# Patient Record
Sex: FEMALE | Race: WHITE | NOT HISPANIC OR LATINO | Employment: OTHER | URBAN - METROPOLITAN AREA
[De-identification: names, ages, dates, MRNs, and addresses within clinical notes are randomized per-mention and may not be internally consistent; named-entity substitution may affect disease eponyms.]

---

## 2017-01-11 PROBLEM — N28.9 RENAL INSUFFICIENCY: Status: ACTIVE | Noted: 2017-01-11

## 2017-03-16 ENCOUNTER — HOSPITAL ENCOUNTER (OUTPATIENT)
Dept: LAB | Facility: MEDICAL CENTER | Age: 82
End: 2017-03-16
Attending: INTERNAL MEDICINE
Payer: MEDICARE

## 2017-03-16 DIAGNOSIS — I10 ESSENTIAL HYPERTENSION, BENIGN: ICD-10-CM

## 2017-03-16 DIAGNOSIS — E78.5 HYPERLIPIDEMIA, UNSPECIFIED HYPERLIPIDEMIA TYPE: ICD-10-CM

## 2017-03-16 LAB
ALBUMIN SERPL BCP-MCNC: 4 G/DL (ref 3.2–4.9)
ALBUMIN/GLOB SERPL: 1.5 G/DL
ALP SERPL-CCNC: 59 U/L (ref 30–99)
ALT SERPL-CCNC: 13 U/L (ref 2–50)
ANION GAP SERPL CALC-SCNC: 7 MMOL/L (ref 0–11.9)
AST SERPL-CCNC: 20 U/L (ref 12–45)
BASOPHILS # BLD AUTO: 1.3 % (ref 0–1.8)
BASOPHILS # BLD: 0.08 K/UL (ref 0–0.12)
BILIRUB SERPL-MCNC: 0.7 MG/DL (ref 0.1–1.5)
BUN SERPL-MCNC: 22 MG/DL (ref 8–22)
CALCIUM SERPL-MCNC: 9.1 MG/DL (ref 8.4–10.2)
CHLORIDE SERPL-SCNC: 109 MMOL/L (ref 96–112)
CHOLEST SERPL-MCNC: 210 MG/DL (ref 100–199)
CO2 SERPL-SCNC: 22 MMOL/L (ref 20–33)
CREAT SERPL-MCNC: 1.12 MG/DL (ref 0.5–1.4)
EOSINOPHIL # BLD AUTO: 0.26 K/UL (ref 0–0.51)
EOSINOPHIL NFR BLD: 4.3 % (ref 0–6.9)
ERYTHROCYTE [DISTWIDTH] IN BLOOD BY AUTOMATED COUNT: 43 FL (ref 35.9–50)
GFR SERPL CREATININE-BSD FRML MDRD: 46 ML/MIN/1.73 M 2
GLOBULIN SER CALC-MCNC: 2.7 G/DL (ref 1.9–3.5)
GLUCOSE SERPL-MCNC: 96 MG/DL (ref 65–99)
HCT VFR BLD AUTO: 34.2 % (ref 37–47)
HDLC SERPL-MCNC: 49 MG/DL
HGB BLD-MCNC: 11.7 G/DL (ref 12–16)
IMM GRANULOCYTES # BLD AUTO: 0.03 K/UL (ref 0–0.11)
IMM GRANULOCYTES NFR BLD AUTO: 0.5 % (ref 0–0.9)
LDLC SERPL CALC-MCNC: 116 MG/DL
LYMPHOCYTES # BLD AUTO: 1.92 K/UL (ref 1–4.8)
LYMPHOCYTES NFR BLD: 31.8 % (ref 22–41)
MCH RBC QN AUTO: 32.6 PG (ref 27–33)
MCHC RBC AUTO-ENTMCNC: 34.2 G/DL (ref 33.6–35)
MCV RBC AUTO: 95.3 FL (ref 81.4–97.8)
MONOCYTES # BLD AUTO: 0.68 K/UL (ref 0–0.85)
MONOCYTES NFR BLD AUTO: 11.3 % (ref 0–13.4)
NEUTROPHILS # BLD AUTO: 3.06 K/UL (ref 2–7.15)
NEUTROPHILS NFR BLD: 50.8 % (ref 44–72)
NRBC # BLD AUTO: 0 K/UL
NRBC BLD AUTO-RTO: 0 /100 WBC
PLATELET # BLD AUTO: 201 K/UL (ref 164–446)
PMV BLD AUTO: 9.4 FL (ref 9–12.9)
POTASSIUM SERPL-SCNC: 4.1 MMOL/L (ref 3.6–5.5)
PROT SERPL-MCNC: 6.7 G/DL (ref 6–8.2)
RBC # BLD AUTO: 3.59 M/UL (ref 4.2–5.4)
SODIUM SERPL-SCNC: 138 MMOL/L (ref 135–145)
T4 FREE SERPL-MCNC: 1.36 NG/DL (ref 0.58–1.64)
TRIGL SERPL-MCNC: 224 MG/DL (ref 0–149)
TSH SERPL DL<=0.005 MIU/L-ACNC: 0.16 UIU/ML (ref 0.35–5.5)
WBC # BLD AUTO: 6 K/UL (ref 4.8–10.8)

## 2017-03-16 PROCEDURE — 80061 LIPID PANEL: CPT

## 2017-03-16 PROCEDURE — 80053 COMPREHEN METABOLIC PANEL: CPT

## 2017-03-16 PROCEDURE — 36415 COLL VENOUS BLD VENIPUNCTURE: CPT

## 2017-03-16 PROCEDURE — 85025 COMPLETE CBC W/AUTO DIFF WBC: CPT

## 2017-03-16 PROCEDURE — 84443 ASSAY THYROID STIM HORMONE: CPT

## 2017-03-16 PROCEDURE — 84439 ASSAY OF FREE THYROXINE: CPT

## 2018-06-04 ENCOUNTER — OFFICE VISIT (OUTPATIENT)
Dept: MEDICAL GROUP | Facility: MEDICAL CENTER | Age: 83
End: 2018-06-04
Payer: COMMERCIAL

## 2018-06-04 VITALS
HEIGHT: 63 IN | OXYGEN SATURATION: 96 % | DIASTOLIC BLOOD PRESSURE: 70 MMHG | BODY MASS INDEX: 28.44 KG/M2 | SYSTOLIC BLOOD PRESSURE: 126 MMHG | TEMPERATURE: 98.2 F | HEART RATE: 89 BPM | RESPIRATION RATE: 14 BRPM | WEIGHT: 160.5 LBS

## 2018-06-04 DIAGNOSIS — D64.9 ANEMIA, UNSPECIFIED TYPE: ICD-10-CM

## 2018-06-04 DIAGNOSIS — E03.9 ACQUIRED HYPOTHYROIDISM: ICD-10-CM

## 2018-06-04 DIAGNOSIS — I10 ESSENTIAL HYPERTENSION, BENIGN: ICD-10-CM

## 2018-06-04 DIAGNOSIS — R73.03 PREDIABETES: ICD-10-CM

## 2018-06-04 DIAGNOSIS — R11.15 NON-INTRACTABLE CYCLICAL VOMITING WITH NAUSEA: ICD-10-CM

## 2018-06-04 PROCEDURE — 99204 OFFICE O/P NEW MOD 45 MIN: CPT | Performed by: FAMILY MEDICINE

## 2018-06-04 RX ORDER — LEVOTHYROXINE SODIUM 100 MCG
TABLET ORAL
Qty: 90 TAB | Refills: 3 | Status: SHIPPED | OUTPATIENT
Start: 2018-06-04 | End: 2018-06-10

## 2018-06-04 RX ORDER — IRBESARTAN 150 MG/1
TABLET ORAL
Qty: 90 TAB | Refills: 3 | Status: SHIPPED | OUTPATIENT
Start: 2018-06-04 | End: 2018-06-10

## 2018-06-04 NOTE — ASSESSMENT & PLAN NOTE
This is a new problem that started approximately 30 days ago for this patient.  When it initially happened, her son with whom she resides, states that it happened after she ate a fairly diverse dinner and they thought it was food poisoning because she had both emesis and diarrhea.  Then 10 days later it occurred after a lunch where they shared a sandwich and he did not become ill and all she had was emesis.  In the most recent episode 7 days ago with emesis after breakfast.  When this occurs she simply throws up and then feels better but ends up being very tired and sleeping for 8-9 hours.  They found nothing that makes this better other than throwing up.  Also found nothing that makes it worse.  She has not had blood work done for approximately 4 months.  She has a history of hysterectomy but retains her ovaries, but denies pain of any type.

## 2018-06-04 NOTE — ASSESSMENT & PLAN NOTE
This is a chronic health problem that is uncontrolled with current medications and lifestyle measures.  Needs blood work .

## 2018-06-04 NOTE — PROGRESS NOTES
CC:  Diagnoses of Essential hypertension, benign, Prediabetes, Acquired hypothyroidism, Anemia, unspecified type, and Non-intractable cyclical vomiting with nausea were pertinent to this visit.    HISTORY OF THE PRESENT ILLNESS: Patient is a 89 y.o. female. This pleasant patient is here today accompanied by her son, Arnold with whom she resides.  They are here to establish care.  Previously she lived in [place] and saw Dr. Bladimir Figueroa.  She has not establish care within [facility].    Health Maintenance: Completed      Essential hypertension, benign  This is a chronic health problem that is well controlled with current medications and lifestyle measures.  The patient denies chest pain, shortness of breath or dyspnea on exertion.      Prediabetes  This is a chronic health problem that is uncontrolled with current medications and lifestyle measures.  Needs baseline blood work.    Hypothyroidism  This is a chronic health problem that is uncontrolled with current medications and lifestyle measures.  Needs blood work .    Anemia  This is a chronic health problem that is uncontrolled with current medications and lifestyle measures.    Non-intractable cyclical vomiting with nausea  This is a new problem that started approximately 30 days ago for this patient.  When it initially happened, her son with whom she resides, states that it happened after she ate a fairly diverse dinner and they thought it was food poisoning because she had both emesis and diarrhea.  Then 10 days later it occurred after a lunch where they shared a sandwich and he did not become ill and all she had was emesis.  In the most recent episode 7 days ago with emesis after breakfast.  When this occurs she simply throws up and then feels better but ends up being very tired and sleeping for 8-9 hours.  They found nothing that makes this better other than throwing up.  Also found nothing that makes it worse.  She has not had blood work done for  approximately 4 months.  She has a history of hysterectomy but retains her ovaries, but denies pain of any type.    Allergies: Patient has no known allergies.    Current Outpatient Prescriptions Ordered in [facility]   Medication Sig Dispense Refill   • SYNTHROID 100 MCG Tab TAKE 1 TABLET DAILY 90 Tab 3   • irbesartan (AVAPRO) 150 MG Tab TAKE 1 TABLET DAILY 90 Tab 3     No current Epic-ordered facility-administered medications on file.        Past Medical History:   Diagnosis Date   • Anemia    • Arthritis    • HEMATURIA    • Hyperglycemia 5/14/2013   • Hyperlipidemia    • Hypertension    • Hypothyroid    • Non-intractable cyclical vomiting with nausea 6/4/2018   • Prediabetes 5/14/2013       Past Surgical History:   Procedure Laterality Date   • HYSTERECTOMY, VAGINAL     • TONSILLECTOMY         Social History   Substance Use Topics   • Smoking status: Never Smoker   • Smokeless tobacco: Never Used   • Alcohol use No      Comment: occasional       Social History     Social History Narrative   • No narrative on file       Family History   Problem Relation Age of Onset   • Heart Disease Mother      MI   • Other Father      old age   • Alcohol/Drug Sister      alcoholism       ROS:     - Constitutional: Negative for fever, chills, unexpected weight change, and fatigue/generalized weakness.     - HEENT: Negative for headaches, vision changes, hearing changes, ear pain, ear discharge, rhinorrhea, sinus congestion, sore throat, and neck pain.      - Respiratory: Negative for cough, sputum production, chest congestion, dyspnea, wheezing, and crackles.      - Cardiovascular: Negative for chest pain, palpitations, orthopnea, and bilateral lower extremity edema.     - Gastrointestinal: Positive for history as in HPI otherwise denies heartburn, abdominal pain, hematochezia, melena, diarrhea, constipation, and greasy/foul-smelling stools.     - Genitourinary: Negative for dysuria, polyuria, hematuria, pyuria, urinary urgency, and  "urinary incontinence.     - Musculoskeletal: Negative for myalgias, back pain, and joint pain.     - Skin: Negative for rash, itching, cyanotic skin color change.     - Neurological: Negative for dizziness, tingling, tremors, focal sensory deficit, focal weakness and headaches.     - Endo/Heme/Allergies: Does not bruise/bleed easily.     - Psychiatric/Behavioral: Positive for short-term memory loss otherwise denies depression, suicidal/homicidal ideation.       - NOTE: All other systems reviewed and are negative, except as in HPI.      .      Exam: Blood pressure 126/70, pulse 89, temperature 36.8 °C (98.2 °F), resp. rate 14, height 1.6 m (5' 3\"), weight 72.8 kg (160 lb 7.9 oz), SpO2 96 %, not currently breastfeeding. Body mass index is 28.43 kg/m².    General: Normal appearing. No distress.  Appears her stated age and somewhat hard of hearing but wears bilateral hearing aids.  HEENT: Normocephalic. Eyes conjunctiva clear lids without ptosis, pupils equal and reactive to light accommodation, ears normal shape and contour, canals are clear bilaterally, tympanic membranes are benign, nasal mucosa benign, oropharynx is without erythema, edema or exudates.   Neck: Supple without JVD or bruit. Thyroid is not enlarged.  Pulmonary: Clear to ausculation.  Normal effort. No rales, ronchi, or wheezing.  Cardiovascular: Regular rate and rhythm without murmur. Carotid and radial pulses are intact and equal bilaterally.  Abdomen: Soft, nontender, nondistended. Normal bowel sounds. Liver and spleen are not palpable  Neurologic: Grossly nonfocal  Lymph: No cervical, supraclavicular or axillary lymph nodes are palpable  Skin: Warm and dry.  No obvious lesions.  Musculoskeletal: Normal gait. No extremity cyanosis, clubbing, or edema.  Psych: Normal mood and affect. Alert and oriented x3. Judgment and insight is normal.    Please note that this dictation was created using voice recognition software. I have made every reasonable " attempt to correct obvious errors, but I expect that there are errors of grammar and possibly content that I did not discover before finalizing the note.      Assessment/Plan  1. Essential hypertension, benign  Controlled, continue with current meds and lifestyle.      2. Prediabetes  Unknown control, patient carries this diagnosis from previous providers.  We will check baseline comprehensive metabolic panel.  I am not going to check cholesterol on this patient because at age 89 I do not think she needs to be on medication.  - COMP METABOLIC PANEL; Future    3. Acquired hypothyroidism  Previous provider had a TSH that was suppressed indicating that her dose may be too high.  We will recheck blood work and see her back to go over results  - TSH; Future  - FREE THYROXINE; Future  - TRIIDOTHYRONINE; Future    4. Anemia, unspecified type  Uncontrolled, previous provider did blood work a year ago that showed she was mildly anemic which may be secondary to chronic kidney disease.  We will check baseline blood work and see her back.  - CBC WITH DIFFERENTIAL; Future    5. Non-intractable cyclical vomiting with nausea  Uncontrolled, they will continue to keep track of how often this is occurring and we will get baseline blood work and see her back.  I did not do a CEA in light of the fact that she has had no change in bowel habits.

## 2018-06-04 NOTE — ASSESSMENT & PLAN NOTE
This is a chronic health problem that is uncontrolled with current medications and lifestyle measures.

## 2018-06-04 NOTE — ASSESSMENT & PLAN NOTE
This is a chronic health problem that is uncontrolled with current medications and lifestyle measures.  Needs baseline blood work.

## 2018-06-05 ENCOUNTER — TELEPHONE (OUTPATIENT)
Dept: MEDICAL GROUP | Facility: MEDICAL CENTER | Age: 83
End: 2018-06-05

## 2018-06-05 NOTE — TELEPHONE ENCOUNTER
Patient's son called on 06/04/18 saying that Ginger was not feeling right and did not know what to do. I informed Dr. Marquez of the following and she stated she would call the patient. She told me today that she forgot to call the patient and wanted me to call and check up on her. I have called patient's son left a message asking how Ginger was doing and if she wasn't feeling any better to please take her to ER. I have left her son my direct line to call me back to let me know how Ginger is doing.

## 2018-06-08 ENCOUNTER — HOSPITAL ENCOUNTER (OUTPATIENT)
Dept: LAB | Facility: MEDICAL CENTER | Age: 83
End: 2018-06-08
Attending: FAMILY MEDICINE
Payer: COMMERCIAL

## 2018-06-08 DIAGNOSIS — E03.9 ACQUIRED HYPOTHYROIDISM: ICD-10-CM

## 2018-06-08 DIAGNOSIS — D64.9 ANEMIA, UNSPECIFIED TYPE: ICD-10-CM

## 2018-06-08 DIAGNOSIS — R73.03 PREDIABETES: ICD-10-CM

## 2018-06-08 LAB
ALBUMIN SERPL BCP-MCNC: 4 G/DL (ref 3.2–4.9)
ALBUMIN/GLOB SERPL: 1.5 G/DL
ALP SERPL-CCNC: 59 U/L (ref 30–99)
ALT SERPL-CCNC: 10 U/L (ref 2–50)
ANION GAP SERPL CALC-SCNC: 10 MMOL/L (ref 0–11.9)
AST SERPL-CCNC: 14 U/L (ref 12–45)
BASOPHILS # BLD AUTO: 1.2 % (ref 0–1.8)
BASOPHILS # BLD: 0.08 K/UL (ref 0–0.12)
BILIRUB SERPL-MCNC: 0.8 MG/DL (ref 0.1–1.5)
BUN SERPL-MCNC: 28 MG/DL (ref 8–22)
CALCIUM SERPL-MCNC: 9.4 MG/DL (ref 8.5–10.5)
CHLORIDE SERPL-SCNC: 105 MMOL/L (ref 96–112)
CO2 SERPL-SCNC: 23 MMOL/L (ref 20–33)
CREAT SERPL-MCNC: 1.26 MG/DL (ref 0.5–1.4)
EOSINOPHIL # BLD AUTO: 0.2 K/UL (ref 0–0.51)
EOSINOPHIL NFR BLD: 3.1 % (ref 0–6.9)
ERYTHROCYTE [DISTWIDTH] IN BLOOD BY AUTOMATED COUNT: 41.4 FL (ref 35.9–50)
GLOBULIN SER CALC-MCNC: 2.7 G/DL (ref 1.9–3.5)
GLUCOSE SERPL-MCNC: 85 MG/DL (ref 65–99)
HCT VFR BLD AUTO: 34.7 % (ref 37–47)
HGB BLD-MCNC: 11.7 G/DL (ref 12–16)
IMM GRANULOCYTES # BLD AUTO: 0.02 K/UL (ref 0–0.11)
IMM GRANULOCYTES NFR BLD AUTO: 0.3 % (ref 0–0.9)
LYMPHOCYTES # BLD AUTO: 2.26 K/UL (ref 1–4.8)
LYMPHOCYTES NFR BLD: 34.6 % (ref 22–41)
MCH RBC QN AUTO: 31.7 PG (ref 27–33)
MCHC RBC AUTO-ENTMCNC: 33.7 G/DL (ref 33.6–35)
MCV RBC AUTO: 94 FL (ref 81.4–97.8)
MONOCYTES # BLD AUTO: 0.59 K/UL (ref 0–0.85)
MONOCYTES NFR BLD AUTO: 9 % (ref 0–13.4)
NEUTROPHILS # BLD AUTO: 3.39 K/UL (ref 2–7.15)
NEUTROPHILS NFR BLD: 51.8 % (ref 44–72)
NRBC # BLD AUTO: 0 K/UL
NRBC BLD-RTO: 0 /100 WBC
PLATELET # BLD AUTO: 232 K/UL (ref 164–446)
PMV BLD AUTO: 9.6 FL (ref 9–12.9)
POTASSIUM SERPL-SCNC: 4.1 MMOL/L (ref 3.6–5.5)
PROT SERPL-MCNC: 6.7 G/DL (ref 6–8.2)
RBC # BLD AUTO: 3.69 M/UL (ref 4.2–5.4)
SODIUM SERPL-SCNC: 138 MMOL/L (ref 135–145)
T3 SERPL-MCNC: 72.8 NG/DL (ref 60–181)
T4 FREE SERPL-MCNC: 1.51 NG/DL (ref 0.53–1.43)
TSH SERPL DL<=0.005 MIU/L-ACNC: 0.17 UIU/ML (ref 0.38–5.33)
WBC # BLD AUTO: 6.5 K/UL (ref 4.8–10.8)

## 2018-06-08 PROCEDURE — 36415 COLL VENOUS BLD VENIPUNCTURE: CPT

## 2018-06-08 PROCEDURE — 80053 COMPREHEN METABOLIC PANEL: CPT

## 2018-06-08 PROCEDURE — 84480 ASSAY TRIIODOTHYRONINE (T3): CPT

## 2018-06-08 PROCEDURE — 84439 ASSAY OF FREE THYROXINE: CPT

## 2018-06-08 PROCEDURE — 84443 ASSAY THYROID STIM HORMONE: CPT

## 2018-06-08 PROCEDURE — 85025 COMPLETE CBC W/AUTO DIFF WBC: CPT

## 2018-06-10 ENCOUNTER — APPOINTMENT (OUTPATIENT)
Dept: RADIOLOGY | Facility: MEDICAL CENTER | Age: 83
End: 2018-06-10
Attending: EMERGENCY MEDICINE
Payer: COMMERCIAL

## 2018-06-10 ENCOUNTER — HOSPITAL ENCOUNTER (OUTPATIENT)
Facility: MEDICAL CENTER | Age: 83
End: 2018-06-11
Attending: EMERGENCY MEDICINE | Admitting: HOSPITALIST
Payer: COMMERCIAL

## 2018-06-10 DIAGNOSIS — R42 VERTIGO: ICD-10-CM

## 2018-06-10 DIAGNOSIS — R11.2 NON-INTRACTABLE VOMITING WITH NAUSEA, UNSPECIFIED VOMITING TYPE: ICD-10-CM

## 2018-06-10 LAB
ALBUMIN SERPL BCP-MCNC: 4 G/DL (ref 3.2–4.9)
ALBUMIN/GLOB SERPL: 1.5 G/DL
ALP SERPL-CCNC: 55 U/L (ref 30–99)
ALT SERPL-CCNC: 14 U/L (ref 2–50)
ANION GAP SERPL CALC-SCNC: 10 MMOL/L (ref 0–11.9)
APPEARANCE UR: CLEAR
APTT PPP: 24.1 SEC (ref 24.7–36)
AST SERPL-CCNC: 19 U/L (ref 12–45)
BASOPHILS # BLD AUTO: 1 % (ref 0–1.8)
BASOPHILS # BLD: 0.09 K/UL (ref 0–0.12)
BILIRUB SERPL-MCNC: 0.9 MG/DL (ref 0.1–1.5)
BILIRUB UR QL STRIP.AUTO: NEGATIVE
BNP SERPL-MCNC: 102 PG/ML (ref 0–100)
BUN SERPL-MCNC: 27 MG/DL (ref 8–22)
CALCIUM SERPL-MCNC: 9.3 MG/DL (ref 8.4–10.2)
CHLORIDE SERPL-SCNC: 107 MMOL/L (ref 96–112)
CO2 SERPL-SCNC: 19 MMOL/L (ref 20–33)
COLOR UR: YELLOW
CREAT SERPL-MCNC: 1.26 MG/DL (ref 0.5–1.4)
EKG IMPRESSION: NORMAL
EOSINOPHIL # BLD AUTO: 0.17 K/UL (ref 0–0.51)
EOSINOPHIL NFR BLD: 1.9 % (ref 0–6.9)
ERYTHROCYTE [DISTWIDTH] IN BLOOD BY AUTOMATED COUNT: 38.7 FL (ref 35.9–50)
GLOBULIN SER CALC-MCNC: 2.7 G/DL (ref 1.9–3.5)
GLUCOSE SERPL-MCNC: 104 MG/DL (ref 65–99)
GLUCOSE UR STRIP.AUTO-MCNC: NEGATIVE MG/DL
HCT VFR BLD AUTO: 32.1 % (ref 37–47)
HGB BLD-MCNC: 11.5 G/DL (ref 12–16)
IMM GRANULOCYTES # BLD AUTO: 0.03 K/UL (ref 0–0.11)
IMM GRANULOCYTES NFR BLD AUTO: 0.3 % (ref 0–0.9)
INR PPP: 1.08 (ref 0.87–1.13)
KETONES UR STRIP.AUTO-MCNC: NEGATIVE MG/DL
LEUKOCYTE ESTERASE UR QL STRIP.AUTO: NEGATIVE
LIPASE SERPL-CCNC: 33 U/L (ref 7–58)
LYMPHOCYTES # BLD AUTO: 2.53 K/UL (ref 1–4.8)
LYMPHOCYTES NFR BLD: 27.7 % (ref 22–41)
MCH RBC QN AUTO: 32.1 PG (ref 27–33)
MCHC RBC AUTO-ENTMCNC: 35.8 G/DL (ref 33.6–35)
MCV RBC AUTO: 89.7 FL (ref 81.4–97.8)
MICRO URNS: NORMAL
MONOCYTES # BLD AUTO: 0.75 K/UL (ref 0–0.85)
MONOCYTES NFR BLD AUTO: 8.2 % (ref 0–13.4)
NEUTROPHILS # BLD AUTO: 5.56 K/UL (ref 2–7.15)
NEUTROPHILS NFR BLD: 60.9 % (ref 44–72)
NITRITE UR QL STRIP.AUTO: NEGATIVE
NRBC # BLD AUTO: 0 K/UL
NRBC BLD-RTO: 0 /100 WBC
PH UR STRIP.AUTO: 5.5 [PH]
PLATELET # BLD AUTO: 220 K/UL (ref 164–446)
PMV BLD AUTO: 9.1 FL (ref 9–12.9)
POTASSIUM SERPL-SCNC: 3.6 MMOL/L (ref 3.6–5.5)
PROT SERPL-MCNC: 6.7 G/DL (ref 6–8.2)
PROT UR QL STRIP: NEGATIVE MG/DL
PROTHROMBIN TIME: 13.9 SEC (ref 12–14.6)
RBC # BLD AUTO: 3.58 M/UL (ref 4.2–5.4)
RBC UR QL AUTO: NEGATIVE
SODIUM SERPL-SCNC: 136 MMOL/L (ref 135–145)
SP GR UR STRIP.AUTO: 1.01
TROPONIN I SERPL-MCNC: <0.02 NG/ML (ref 0–0.04)
WBC # BLD AUTO: 9.1 K/UL (ref 4.8–10.8)

## 2018-06-10 PROCEDURE — 700105 HCHG RX REV CODE 258: Performed by: EMERGENCY MEDICINE

## 2018-06-10 PROCEDURE — 83690 ASSAY OF LIPASE: CPT

## 2018-06-10 PROCEDURE — 85730 THROMBOPLASTIN TIME PARTIAL: CPT

## 2018-06-10 PROCEDURE — 85610 PROTHROMBIN TIME: CPT

## 2018-06-10 PROCEDURE — 80053 COMPREHEN METABOLIC PANEL: CPT

## 2018-06-10 PROCEDURE — 36415 COLL VENOUS BLD VENIPUNCTURE: CPT

## 2018-06-10 PROCEDURE — 93005 ELECTROCARDIOGRAM TRACING: CPT | Performed by: EMERGENCY MEDICINE

## 2018-06-10 PROCEDURE — 94760 N-INVAS EAR/PLS OXIMETRY 1: CPT

## 2018-06-10 PROCEDURE — G0378 HOSPITAL OBSERVATION PER HR: HCPCS

## 2018-06-10 PROCEDURE — 83036 HEMOGLOBIN GLYCOSYLATED A1C: CPT

## 2018-06-10 PROCEDURE — 81003 URINALYSIS AUTO W/O SCOPE: CPT

## 2018-06-10 PROCEDURE — 70450 CT HEAD/BRAIN W/O DYE: CPT

## 2018-06-10 PROCEDURE — A9270 NON-COVERED ITEM OR SERVICE: HCPCS | Performed by: EMERGENCY MEDICINE

## 2018-06-10 PROCEDURE — 99285 EMERGENCY DEPT VISIT HI MDM: CPT

## 2018-06-10 PROCEDURE — 84484 ASSAY OF TROPONIN QUANT: CPT

## 2018-06-10 PROCEDURE — 99219 PR INITIAL OBSERVATION CARE,LEVL II: CPT | Performed by: HOSPITALIST

## 2018-06-10 PROCEDURE — 83880 ASSAY OF NATRIURETIC PEPTIDE: CPT

## 2018-06-10 PROCEDURE — 700102 HCHG RX REV CODE 250 W/ 637 OVERRIDE(OP): Performed by: EMERGENCY MEDICINE

## 2018-06-10 PROCEDURE — 700105 HCHG RX REV CODE 258: Performed by: HOSPITALIST

## 2018-06-10 PROCEDURE — 85025 COMPLETE CBC W/AUTO DIFF WBC: CPT

## 2018-06-10 PROCEDURE — 71045 X-RAY EXAM CHEST 1 VIEW: CPT

## 2018-06-10 RX ORDER — AMOXICILLIN 250 MG
2 CAPSULE ORAL 2 TIMES DAILY
Status: DISCONTINUED | OUTPATIENT
Start: 2018-06-10 | End: 2018-06-11 | Stop reason: HOSPADM

## 2018-06-10 RX ORDER — SODIUM CHLORIDE 9 MG/ML
INJECTION, SOLUTION INTRAVENOUS CONTINUOUS
Status: DISCONTINUED | OUTPATIENT
Start: 2018-06-10 | End: 2018-06-11 | Stop reason: HOSPADM

## 2018-06-10 RX ORDER — BISACODYL 10 MG
10 SUPPOSITORY, RECTAL RECTAL
Status: DISCONTINUED | OUTPATIENT
Start: 2018-06-10 | End: 2018-06-11 | Stop reason: HOSPADM

## 2018-06-10 RX ORDER — ACETAMINOPHEN 325 MG/1
650 TABLET ORAL EVERY 6 HOURS PRN
Status: DISCONTINUED | OUTPATIENT
Start: 2018-06-10 | End: 2018-06-11 | Stop reason: HOSPADM

## 2018-06-10 RX ORDER — LEVOTHYROXINE SODIUM 0.1 MG/1
100 TABLET ORAL EVERY EVENING
COMMUNITY
End: 2018-07-23

## 2018-06-10 RX ORDER — MECLIZINE HYDROCHLORIDE 25 MG/1
25 TABLET ORAL 3 TIMES DAILY PRN
Status: DISCONTINUED | OUTPATIENT
Start: 2018-06-10 | End: 2018-06-10

## 2018-06-10 RX ORDER — ONDANSETRON 4 MG/1
4 TABLET, ORALLY DISINTEGRATING ORAL EVERY 4 HOURS PRN
Status: DISCONTINUED | OUTPATIENT
Start: 2018-06-10 | End: 2018-06-11 | Stop reason: HOSPADM

## 2018-06-10 RX ORDER — ONDANSETRON 2 MG/ML
4 INJECTION INTRAMUSCULAR; INTRAVENOUS EVERY 4 HOURS PRN
Status: DISCONTINUED | OUTPATIENT
Start: 2018-06-10 | End: 2018-06-11 | Stop reason: HOSPADM

## 2018-06-10 RX ORDER — IRBESARTAN 150 MG/1
150 TABLET ORAL NIGHTLY
COMMUNITY
End: 2018-10-22 | Stop reason: SDUPTHER

## 2018-06-10 RX ORDER — LEVOTHYROXINE SODIUM 0.05 MG/1
75 TABLET ORAL EVERY EVENING
Status: DISCONTINUED | OUTPATIENT
Start: 2018-06-10 | End: 2018-06-11 | Stop reason: HOSPADM

## 2018-06-10 RX ORDER — SODIUM CHLORIDE 9 MG/ML
1000 INJECTION, SOLUTION INTRAVENOUS ONCE
Status: COMPLETED | OUTPATIENT
Start: 2018-06-10 | End: 2018-06-10

## 2018-06-10 RX ORDER — OMEPRAZOLE 20 MG/1
20 CAPSULE, DELAYED RELEASE ORAL DAILY
Status: DISCONTINUED | OUTPATIENT
Start: 2018-06-10 | End: 2018-06-11 | Stop reason: HOSPADM

## 2018-06-10 RX ORDER — IRBESARTAN 150 MG/1
150 TABLET ORAL NIGHTLY
Status: DISCONTINUED | OUTPATIENT
Start: 2018-06-10 | End: 2018-06-11 | Stop reason: HOSPADM

## 2018-06-10 RX ORDER — LEVOTHYROXINE SODIUM 0.05 MG/1
100 TABLET ORAL EVERY EVENING
Status: DISCONTINUED | OUTPATIENT
Start: 2018-06-10 | End: 2018-06-10

## 2018-06-10 RX ORDER — POLYETHYLENE GLYCOL 3350 17 G/17G
1 POWDER, FOR SOLUTION ORAL
Status: DISCONTINUED | OUTPATIENT
Start: 2018-06-10 | End: 2018-06-11 | Stop reason: HOSPADM

## 2018-06-10 RX ADMIN — MECLIZINE HYDROCHLORIDE 25 MG: 25 TABLET ORAL at 16:50

## 2018-06-10 RX ADMIN — SODIUM CHLORIDE: 9 INJECTION, SOLUTION INTRAVENOUS at 21:15

## 2018-06-10 RX ADMIN — SODIUM CHLORIDE 1000 ML: 9 INJECTION, SOLUTION INTRAVENOUS at 16:54

## 2018-06-10 ASSESSMENT — PATIENT HEALTH QUESTIONNAIRE - PHQ9
2. FEELING DOWN, DEPRESSED, IRRITABLE, OR HOPELESS: NOT AT ALL
1. LITTLE INTEREST OR PLEASURE IN DOING THINGS: NOT AT ALL
SUM OF ALL RESPONSES TO PHQ9 QUESTIONS 1 AND 2: 0

## 2018-06-10 ASSESSMENT — PAIN SCALES - GENERAL
PAINLEVEL_OUTOF10: 0

## 2018-06-10 ASSESSMENT — ENCOUNTER SYMPTOMS
TINGLING: 0
TREMORS: 0
DIZZINESS: 1
PSYCHIATRIC NEGATIVE: 1
SENSORY CHANGE: 0
NAUSEA: 1
VOMITING: 1
SPEECH CHANGE: 0
RESPIRATORY NEGATIVE: 1
FOCAL WEAKNESS: 0
MUSCULOSKELETAL NEGATIVE: 1
CARDIOVASCULAR NEGATIVE: 1

## 2018-06-10 ASSESSMENT — LIFESTYLE VARIABLES
ALCOHOL_USE: NO
EVER_SMOKED: NEVER
EVER_SMOKED: NEVER
DO YOU DRINK ALCOHOL: NO

## 2018-06-10 ASSESSMENT — COPD QUESTIONNAIRES
HAVE YOU SMOKED AT LEAST 100 CIGARETTES IN YOUR ENTIRE LIFE: NO/DON'T KNOW
DURING THE PAST 4 WEEKS HOW MUCH DID YOU FEEL SHORT OF BREATH: NONE/LITTLE OF THE TIME
DO YOU EVER COUGH UP ANY MUCUS OR PHLEGM?: NO/ONLY WITH OCCASIONAL COLDS OR INFECTIONS
COPD SCREENING SCORE: 2

## 2018-06-10 NOTE — ED PROVIDER NOTES
"ED Provider Note    CHIEF COMPLAINT  Chief Complaint   Patient presents with   • N/V     Sudden onset 1 hour ago.  Per son, pt has N/V approx every 6 days, lasting 8-9 hours.  FSBS 102 per [facility]   • Dizziness     \"room spinning\"   • Blurred Vision     Brief, now resolved.        HPI  Ginger Laura is a 89 y.o. female who presents to the ED with complaints of nausea vomiting and vertigo.  The son states that the patient's every 6 days well and acute onset of vertigo and started vomiting.  The symptoms last about 8-9 hours then seems to resolve.  Today the patient had the same exact symptoms of the vertigo and started vomiting and because of that the patient was brought to the ED for evaluation.  Prior to arrival an IV was established by paramedics the patient was given Zofran upon my evaluation she states that she feeling a little bit better but still has a sensation of the room is spinning.  Patient denies any eliciting factors denies any resolving factors just has the above complaints.  Patient denies any fevers chills does describe the nausea with vomiting describes a vertigo denies any other symptoms.    REVIEW OF SYSTEMS  See HPI for further details. All other systems are negative.     PAST MEDICAL HISTORY  Past Medical History:   Diagnosis Date   • Anemia    • Arthritis    • HEMATURIA    • Hyperglycemia 5/14/2013   • Hyperlipidemia    • Hypertension    • Hypothyroid    • Non-intractable cyclical vomiting with nausea 6/4/2018   • Prediabetes 5/14/2013       FAMILY HISTORY  Family History   Problem Relation Age of Onset   • Heart Disease Mother      MI   • Other Father      old age   • Alcohol/Drug Sister      alcoholism     Patient's family history has been discussed and is been found to be noncontributory to his present illness  SOCIAL HISTORY  Social History     Social History   • Marital status:      Spouse name: N/A   • Number of children: N/A   • Years of education: N/A     Social History Main " "Topics   • Smoking status: Never Smoker   • Smokeless tobacco: Never Used   • Alcohol use No      Comment: occasional   • Drug use: No   • Sexual activity: No      Comment:  in 1974, home loans     Other Topics Concern   • Not on file     Social History Narrative   • No narrative on file      Tia Marquez M.D.      SURGICAL HISTORY  Past Surgical History:   Procedure Laterality Date   • HYSTERECTOMY, VAGINAL     • TONSILLECTOMY         CURRENT MEDICATIONS  Home Medications     Reviewed by Elis Kaminski (Pharmacy Tech) on 06/10/18 at 1624  Med List Status: Complete   Medication Last Dose Status   irbesartan (AVAPRO) 150 MG Tab 6/9/2018 Active   levothyroxine (SYNTHROID) 100 MCG Tab 6/9/2018 Active                ALLERGIES  No Known Allergies    PHYSICAL EXAM  VITAL SIGNS: /73   Pulse 85   Temp 37.3 °C (99.1 °F)   Resp (!) 32   Ht 1.6 m (5' 3\")   Wt 70.7 kg (155 lb 13.8 oz)   SpO2 92%   BMI 27.61 kg/m²    Pulse Oximetry was obtained. It showed a reading of Pulse Oximetry: 96 %.  I interpreted this as nonhypoxic.     Constitutional: Well developed, Well nourished, No acute distress, Non-toxic appearance.   HENT: Normocephalic, Atraumatic, Bilateral external ears normal, bilateral tympanic membranes normal, Oropharynx dry mucous membranes, No oral exudates, Nose normal.   Eyes:  conjunctiva is normal, there are no signs of exudate.   Neck: Supple, no cervical lymphadenopathy, no meningeal signs..   Lymphatic: No lymphadenopathy noted.   Cardiovascular: Regular rate and rhythm without murmurs gallops or rubs.   Thorax & Lungs: Lungs are clear to auscultation bilaterally, there are no wheezes no rales. Chest wall is nontender.  Abdomen: Soft, nontender nondistended. Bowel sounds are present.   Skin: Warm, Dry, No erythema,   Back: No tenderness, No CVA tenderness.  Musculoskeletal: Good range of motion in all major joints. No tenderness to palpation or major deformities noted. Intact " distal pulses, no clubbing, no cyanosis patient has 1+ pitting edema bilateral lower extremities    Neurologic: Alert & oriented x 3, Normal motor function, Normal sensory function, No focal deficits noted.  Negative Washington-Hallpike maneuver bilaterally.  The patient is moving all 4 extremities DTRs are 0-1+ and equal throughout.  The patient is equivocal Babinski's and equal.  On cranial nerves.  The patient does have a very subtle lateral nystagmus on exam.  Otherwise cranial nerves are intact.  Psychiatric: Affect normal, Judgment normal, Mood normal.     EKG  Interpreted below by myself    RADIOLOGY/PROCEDURES  CT-HEAD W/O   Final Result         1. No acute intracranial abnormality. No evidence of acute intracranial hemorrhage or mass lesion.               DX-CHEST-PORTABLE (1 VIEW)   Final Result         1. No acute cardiopulmonary abnormalities are identified.            Results for orders placed or performed during the hospital encounter of 06/10/18   CBC with Differential   Result Value Ref Range    WBC 9.1 4.8 - 10.8 K/uL    RBC 3.58 (L) 4.20 - 5.40 M/uL    Hemoglobin 11.5 (L) 12.0 - 16.0 g/dL    Hematocrit 32.1 (L) 37.0 - 47.0 %    MCV 89.7 81.4 - 97.8 fL    MCH 32.1 27.0 - 33.0 pg    MCHC 35.8 (H) 33.6 - 35.0 g/dL    RDW 38.7 35.9 - 50.0 fL    Platelet Count 220 164 - 446 K/uL    MPV 9.1 9.0 - 12.9 fL    Neutrophils-Polys 60.90 44.00 - 72.00 %    Lymphocytes 27.70 22.00 - 41.00 %    Monocytes 8.20 0.00 - 13.40 %    Eosinophils 1.90 0.00 - 6.90 %    Basophils 1.00 0.00 - 1.80 %    Immature Granulocytes 0.30 0.00 - 0.90 %    Nucleated RBC 0.00 /100 WBC    Neutrophils (Absolute) 5.56 2.00 - 7.15 K/uL    Lymphs (Absolute) 2.53 1.00 - 4.80 K/uL    Monos (Absolute) 0.75 0.00 - 0.85 K/uL    Eos (Absolute) 0.17 0.00 - 0.51 K/uL    Baso (Absolute) 0.09 0.00 - 0.12 K/uL    Immature Granulocytes (abs) 0.03 0.00 - 0.11 K/uL    NRBC (Absolute) 0.00 K/uL   Complete Metabolic Panel (CMP)   Result Value Ref Range    Sodium  136 135 - 145 mmol/L    Potassium 3.6 3.6 - 5.5 mmol/L    Chloride 107 96 - 112 mmol/L    Co2 19 (L) 20 - 33 mmol/L    Anion Gap 10.0 0.0 - 11.9    Glucose 104 (H) 65 - 99 mg/dL    Bun 27 (H) 8 - 22 mg/dL    Creatinine 1.26 0.50 - 1.40 mg/dL    Calcium 9.3 8.4 - 10.2 mg/dL    AST(SGOT) 19 12 - 45 U/L    ALT(SGPT) 14 2 - 50 U/L    Alkaline Phosphatase 55 30 - 99 U/L    Total Bilirubin 0.9 0.1 - 1.5 mg/dL    Albumin 4.0 3.2 - 4.9 g/dL    Total Protein 6.7 6.0 - 8.2 g/dL    Globulin 2.7 1.9 - 3.5 g/dL    A-G Ratio 1.5 g/dL   Btype Natriuretic Peptide (BNP)   Result Value Ref Range    B Natriuretic Peptide 102 (H) 0 - 100 pg/mL   Prothrombin Time (PT/INR)   Result Value Ref Range    PT 13.9 12.0 - 14.6 sec    INR 1.08 0.87 - 1.13   APTT   Result Value Ref Range    APTT 24.1 (L) 24.7 - 36.0 sec   Lipase   Result Value Ref Range    Lipase 33 7 - 58 U/L   Troponin STAT   Result Value Ref Range    Troponin I <0.02 0.00 - 0.04 ng/mL   URINALYSIS,CULTURE IF INDICATED   Result Value Ref Range    Color Yellow     Character Clear     Specific Gravity 1.010 <1.035    Ph 5.5 5.0 - 8.0    Glucose Negative Negative mg/dL    Ketones Negative Negative mg/dL    Protein Negative Negative mg/dL    Bilirubin Negative Negative    Nitrite Negative Negative    Leukocyte Esterase Negative Negative    Occult Blood Negative Negative    Micro Urine Req see below    ESTIMATED GFR   Result Value Ref Range    GFR If  48 (A) >60 mL/min/1.73 m 2    GFR If Non African American 40 (A) >60 mL/min/1.73 m 2   EKG (ER)   Result Value Ref Range    Report       [facility] Emergency Dept.    Test Date:  2018-06-10  Pt Name:    WESTON SANCHES                 Department: EDS  MRN:        [id number]                      [facility]  Gender:     Female                       Technician: HRS  :        [id number]                   Requested By:ALEX TSANG  Order #:    [id number]                    Hansel NEAL:  NÉSTOR CAZARES MD    Measurements  Intervals                                Axis  Rate:       61                           P:          41  LA:         234                          QRS:        8  QRSD:       102                          T:          22  QT:         451  QTc:        455    Interpretive Statements  Sinus arrhythmia  Prolonged LA interval  Borderline T wave abnormalities  No previous ECG available for comparison  no acute findings  Electronically Signed On 6- 16:36:25 PDT by NÉSTOR CAZARES MD           COURSE & MEDICAL DECISION MAKING  Pertinent Labs & Imaging studies reviewed. (See chart for details)  Patient presents for evaluation.  Clinically the patient does have a mild amount of nystagmus on initial examination.  IV was established the patient was given fluids due to signs of dehydration.  She was also given meclizine.  Upon reevaluation at 6:39 PM she states that she is feeling better however she is really unable to sit up because of severe sensation of nausea that grabs her.  The patient has a difficult time explaining what she means and seems to be more that it is the vertigo that is hitting her.  At this point I do feel the patient is unstable and unable to go home.  I recommended for the patient to be admitted for further evaluation to include CVA workup.  I will speak to the hospitalist for this admission.    FINAL IMPRESSION  1. Vertigo    2. Non-intractable vomiting with nausea, unspecified vomiting type                Electronically signed by: Néstor Cazares, 6/10/2018 4:34 PM

## 2018-06-10 NOTE — ED TRIAGE NOTES
"Ginger Laura 89 y.o. female   BIB [facility]    Chief Complaint   Patient presents with   • N/V     Sudden onset 1 hour ago.  Per son, pt has N/V approx every 6 days, lasting 8-9 hours.  FSBS 102 per [facility]   • Dizziness     \"room spinning\"   • Blurred Vision     Brief, now resolved.        Stroke scale negative in the field.  4mg Zofran given PTA.  Chart placed for ERP eval.  "

## 2018-06-10 NOTE — ED NOTES
Med rec updated and complete  Allergies reviewed  Interviewed pt with son at bedside with permission from pt.  Pt son reports no vitamins or OTC's.  Pts son reports no antibiotics in the last 30 days.

## 2018-06-11 ENCOUNTER — APPOINTMENT (OUTPATIENT)
Dept: RADIOLOGY | Facility: MEDICAL CENTER | Age: 83
End: 2018-06-11
Attending: HOSPITALIST
Payer: COMMERCIAL

## 2018-06-11 VITALS
HEIGHT: 63 IN | OXYGEN SATURATION: 98 % | HEART RATE: 60 BPM | DIASTOLIC BLOOD PRESSURE: 50 MMHG | SYSTOLIC BLOOD PRESSURE: 137 MMHG | BODY MASS INDEX: 28.59 KG/M2 | RESPIRATION RATE: 20 BRPM | TEMPERATURE: 97.7 F | WEIGHT: 161.38 LBS

## 2018-06-11 LAB
CHOLEST SERPL-MCNC: 171 MG/DL (ref 100–199)
EST. AVERAGE GLUCOSE BLD GHB EST-MCNC: 108 MG/DL
HBA1C MFR BLD: 5.4 % (ref 0–5.6)
HDLC SERPL-MCNC: 38 MG/DL
LDLC SERPL CALC-MCNC: 110 MG/DL
TRIGL SERPL-MCNC: 117 MG/DL (ref 0–149)

## 2018-06-11 PROCEDURE — A9270 NON-COVERED ITEM OR SERVICE: HCPCS | Performed by: HOSPITALIST

## 2018-06-11 PROCEDURE — 700102 HCHG RX REV CODE 250 W/ 637 OVERRIDE(OP): Performed by: HOSPITALIST

## 2018-06-11 PROCEDURE — G0378 HOSPITAL OBSERVATION PER HR: HCPCS

## 2018-06-11 PROCEDURE — 80061 LIPID PANEL: CPT

## 2018-06-11 PROCEDURE — 99217 PR OBSERVATION CARE DISCHARGE: CPT | Performed by: INTERNAL MEDICINE

## 2018-06-11 PROCEDURE — 700105 HCHG RX REV CODE 258: Performed by: HOSPITALIST

## 2018-06-11 PROCEDURE — 70551 MRI BRAIN STEM W/O DYE: CPT

## 2018-06-11 RX ORDER — ASPIRIN 81 MG/1
81 TABLET ORAL DAILY
Qty: 30 TAB | Refills: 2 | Status: SHIPPED | OUTPATIENT
Start: 2018-06-12 | End: 2019-01-31 | Stop reason: CLARIF

## 2018-06-11 RX ORDER — MECLIZINE HYDROCHLORIDE 25 MG/1
25 TABLET ORAL 3 TIMES DAILY PRN
Qty: 30 TAB | Refills: 0 | Status: SHIPPED | OUTPATIENT
Start: 2018-06-11 | End: 2018-07-23

## 2018-06-11 RX ADMIN — SODIUM CHLORIDE: 9 INJECTION, SOLUTION INTRAVENOUS at 09:18

## 2018-06-11 RX ADMIN — ASPIRIN 81 MG: 81 TABLET, COATED ORAL at 07:59

## 2018-06-11 RX ADMIN — DOCUSATE SODIUM AND SENNOSIDES 2 TABLET: 8.6; 5 TABLET, FILM COATED ORAL at 08:01

## 2018-06-11 RX ADMIN — OMEPRAZOLE 20 MG: 20 CAPSULE, DELAYED RELEASE ORAL at 07:59

## 2018-06-11 ASSESSMENT — PAIN SCALES - GENERAL
PAINLEVEL_OUTOF10: 0
PAINLEVEL_OUTOF10: 0

## 2018-06-11 ASSESSMENT — PATIENT HEALTH QUESTIONNAIRE - PHQ9
2. FEELING DOWN, DEPRESSED, IRRITABLE, OR HOPELESS: NOT AT ALL
SUM OF ALL RESPONSES TO PHQ9 QUESTIONS 1 AND 2: 0
1. LITTLE INTEREST OR PLEASURE IN DOING THINGS: NOT AT ALL

## 2018-06-11 NOTE — ED NOTES
Pt in bed with son at bedside, pt has no complaints at this time, aware we are waiting for a room.

## 2018-06-11 NOTE — H&P
" Hospital Medicine History and Physical    Date of Service  6/10/2018    Chief Complaint  Chief Complaint   Patient presents with   • N/V     Sudden onset 1 hour ago.  Per son, pt has N/V approx every 6 days, lasting 8-9 hours.  FSBS 102 per [facility]   • Dizziness     \"room spinning\"   • Blurred Vision     Brief, now resolved.       History of Presenting Illness  Ginger Laura is a 89 y.o. female who presents to the ED with complaints of nausea ,vomiting and vertigo.  The son states that the patient's every 6 days with acute onset of vertigo  , nausea then bilious vomiting.  The symptoms last about 8-9 hours then resolve.  Today the patient had the same exact symptoms of the vertigo and started vomiting and because of that the patient was brought to the ED for evaluation.  The patient has a sensation of the room is spinning.      She was given meclizine in the ER , that did help her vertigo symptoms..      Labs (done by her PCP) 2 days ago-->show tsh was low and free t4 was elevated       Afebrile    Normal wbc    No abdominal pain      Primary Care Physician  Tia Marquez M.D.    Consultants  none    Code Status  full    Review of Systems  Review of Systems   Constitutional: Positive for malaise/fatigue.   HENT: Negative.    Respiratory: Negative.    Cardiovascular: Negative.    Gastrointestinal: Positive for nausea and vomiting.   Genitourinary: Negative.    Musculoskeletal: Negative.    Skin: Negative.    Neurological: Positive for dizziness. Negative for tingling, tremors, sensory change, speech change and focal weakness.   Psychiatric/Behavioral: Negative.    All other systems reviewed and are negative.       Past Medical History  Past Medical History:   Diagnosis Date   • Non-intractable cyclical vomiting with nausea 6/4/2018   • Hyperglycemia 5/14/2013   • Prediabetes 5/14/2013   • Anemia    • Arthritis    • HEMATURIA    • Hyperlipidemia    • Hypertension    • Hypothyroid        Surgical History  Past " Surgical History:   Procedure Laterality Date   • HYSTERECTOMY, VAGINAL     • TONSILLECTOMY         Medications  No current facility-administered medications on file prior to encounter.      No current outpatient prescriptions on file prior to encounter.       Family History  Family History   Problem Relation Age of Onset   • Heart Disease Mother      MI   • Other Father      old age   • Alcohol/Drug Sister      alcoholism       Social History  Social History   Substance Use Topics   • Smoking status: Never Smoker   • Smokeless tobacco: Never Used   • Alcohol use No      Comment: occasional       Allergies  No Known Allergies     Physical Exam  Laboratory   Hemodynamics  Temp (24hrs), Av.3 °C (99.1 °F), Min:37.3 °C (99.1 °F), Max:37.3 °C (99.1 °F)   Temperature: 37.3 °C (99.1 °F)  Pulse  Av.8  Min: 58  Max: 85 Heart Rate (Monitored): 71  Blood Pressure : 137/73, NIBP: 130/49      Respiratory      Respiration: 18, Pulse Oximetry: 92 %             Physical Exam   Constitutional: She is oriented to person, place, and time. No distress.   HENT:   Head: Normocephalic and atraumatic.   Mouth/Throat: No oropharyngeal exudate.   Eyes: Right eye exhibits no discharge. Left eye exhibits no discharge. No scleral icterus.   Neck: No JVD present. No tracheal deviation present. No thyromegaly present.   Cardiovascular: Normal rate.  Exam reveals no gallop and no friction rub.    No murmur heard.  Pulmonary/Chest: No respiratory distress. She has no wheezes. She exhibits no tenderness.   Abdominal: She exhibits no distension. There is no tenderness. There is no rebound and no guarding.   Musculoskeletal: She exhibits no edema, tenderness or deformity.   Neurological: She is alert and oriented to person, place, and time. No cranial nerve deficit. Coordination normal.   Skin: No rash noted. She is not diaphoretic. No erythema. No pallor.       Recent Labs      18   0913  06/10/18   1629   WBC  6.5  9.1   RBC  3.69*   3.58*   HEMOGLOBIN  11.7*  11.5*   HEMATOCRIT  34.7*  32.1*   MCV  94.0  89.7   MCH  31.7  32.1   MCHC  33.7  35.8*   RDW  41.4  38.7   PLATELETCT  232  220   MPV  9.6  9.1     Recent Labs      06/08/18   0913  06/10/18   1629   SODIUM  138  136   POTASSIUM  4.1  3.6   CHLORIDE  105  107   CO2  23  19*   GLUCOSE  85  104*   BUN  28*  27*   CREATININE  1.26  1.26   CALCIUM  9.4  9.3     Recent Labs      06/08/18   0913  06/10/18   1629   ALTSGPT  10  14   ASTSGOT  14  19   ALKPHOSPHAT  59  55   TBILIRUBIN  0.8  0.9   LIPASE   --   33   GLUCOSE  85  104*     Recent Labs      06/10/18   1629   APTT  24.1*   INR  1.08     Recent Labs      06/10/18   1629   BNPBTYPENAT  102*         Lab Results   Component Value Date    TROPONINI <0.02 06/10/2018     Urinalysis:    Lab Results  Component Value Date/Time   SPECGRAVITY 1.010 06/10/2018 1702   GLUCOSEUR Negative 06/10/2018 1702   KETONES Negative 06/10/2018 1702   NITRITE Negative 06/10/2018 1702        Imaging   Ordering Provider (NPI#): [id number]  NÉSTOR CAZARES  Order Specific Information  Order: : CT-HEAD W/O [Custom: 89422]  Order #: [id number]. #:[id number]: 1    Priority: STAT  Class: Hospital Performed    Comment:MOST FREQUENTLY ORDERED HEAD SCAN; ROUTINE FOR TRAUMA OR TO             R/O BLEED: USE WHENEVER CONTRAST IS NOT SPECIFIED IN WRITTEN ORDERS,              OR WHEN PATIENT HAS KNOWN ALLERGY TO IODINE.                   Reason For Exam: -> Other - Please Comment         Comments to Radiology -> vertigo       Released on: 06/10/2018  5:26 PM      Order Date:6/10/2018    Ordering User:NÉSTOR CAZARES [[id number]]    Authorizing Provider: Néstor Cazares M.D. [[id number]]        Ordering Provider (NPI#): [id number]  NÉSTOR CAZARES              Component       Value Flag       Low      High            Units Stat    Result Narrative:    6/10/2018 5:29 PM    HISTORY/REASON FOR EXAM:  Dizziness      TECHNIQUE/EXAM DESCRIPTION AND NUMBER OF VIEWS:  CT of the  head without contrast.    The study was performed on a helical multidetector CT scanner. Contiguous 2.5   mm axial sections were obtained from the skull base through the vertex.    Up to date radiation dose reduction adjustments have been utilized to meet   ALARA standards for radiation dose reduction.    COMPARISON:  None available    FINDINGS:  There is no evidence of acute intracranial hemorrhage, mass, mass-effect or   shift of midline structures.    Gray-white matter differentiation is grossly preserved.    Ventricle size and brain parenchymal volume are appropriate for this patient's   stated age.    The basal cisterns are patent.    There are no abnormal extra-axial fluid collections.    No depressed or widely  calvarial fracture is seen.    The visualized paranasal sinuses and temporal bone structures are aerated.    ___________________________________      Result Impression:      1. No acute intracranial abnormality. No evidence of acute intracranial   hemorrhage or mass lesion.         Assessment/Plan     I anticipate this patient is appropriate for observation status at this time.    Nausea & vomiting- (present on admission)   Assessment & Plan    Prn iv zofran    hydrate        Vertigo- (present on admission)   Assessment & Plan    Etiology unknown    Asa daily    Check mri brain    tele        Hypothyroidism- (present on admission)   Assessment & Plan    Low tsh    Synthroid decreased from 100 to 75 mcg on 6/10        Essential hypertension- (present on admission)   Assessment & Plan    Cont avapro            VTE prophylaxis: scd

## 2018-06-11 NOTE — PROGRESS NOTES
Sl dc'd site intact  Pt given dc inst and agrees to  meds at pharmacy on file  Waiting for transportation home

## 2018-06-11 NOTE — DISCHARGE SUMMARY
"Discharge Summary    CHIEF COMPLAINT ON ADMISSION  Chief Complaint   Patient presents with   • N/V     Sudden onset 1 hour ago.  Per son, pt has N/V approx every 6 days, lasting 8-9 hours.  FSBS 102 per [facility]   • Dizziness     \"room spinning\"   • Blurred Vision     Brief, now resolved.       Reason for Admission  Nausea     Admission Date  6/10/2018    CODE STATUS  Full Code    HPI & HOSPITAL COURSE  This is a 89 y.o. female with a history of hypertension and hypothyroidism as well as remote history of vertigo here with dizziness and vomiting. She has been having intermittent episodes of vertigo with the room feels like it's spinning for the last 6 days. She has had this happen in the past. She is on any focal weakness of arms or legs and had no confusion or difficulty speaking. She also has not had any shortness of breath or chest discomfort. These symptoms do seem to come on from rapid movement of her head or sometimes positional changes however not related to exertion. On exam she had no heart murmurs were carotid bruits. She also had no focal weakness. Due to the persistence and recurrence, she is admitted for observation overnight and she had no significant events on telemetry. Her TSH was found to be suppressed therefore her Synthroid dose was decreased. She did receive some IV fluids as she appeared slightly dry.  Subsequently, her dizziness resolved. She was able to ambulate without any dizziness. She did undergo an MRI of the brain which showed normal age-related changes however no acute intracranial abnormality and no evidence of CVA.    She was started on as needed meclizine and was given a prescription of this upon discharge. She was told to follow-up with her primary care physician regarding ongoing episodes of vertigo. It could be considered that she get an echocardiogram although, her story and exam are not suggestive of cardiac etiology. She was noted to have bradycardia on telemetry however was not " symptomatic during these times and her heart rates were in the 50s therefore she was unlikely symptomatic from this.    She will follow-up with her primary care physician regarding any further testing such as echocardiogram or carotid ultrasound. Due to the episodic nature, I strongly favor benign positional vertigo as her working diagnosis.    Therefore, she is discharged in good and stable condition to home with close outpatient follow-up.    The patient recovered much more quickly than anticipated on admission.    Discharge Date  6/11/18    FOLLOW UP ITEMS POST DISCHARGE  Follow-up with primary care physician, discussed echocardiogram versus carotid duplex.  Get serial EKG approximately every 6 months to evaluate bradycardia  Take meclizine as needed for dizziness    DISCHARGE DIAGNOSES  Active Problems:    Essential hypertension POA: Yes    Hypothyroidism POA: Yes      Overview: ICD-10 transition    Vertigo POA: Yes    Nausea & vomiting POA: Yes  Resolved Problems:    * No resolved hospital problems. *      FOLLOW UP  Future Appointments  Date Time Provider Department Center   7/23/2018 10:20 AM ROGER Rosales     No follow-up provider specified.    MEDICATIONS ON DISCHARGE     Medication List      START taking these medications      Instructions   aspirin 81 MG EC tablet  Start taking on:  6/12/2018   Take 1 Tab by mouth every day.  Dose:  81 mg     meclizine 25 MG Tabs  Commonly known as:  ANTIVERT   Take 1 Tab by mouth 3 times a day as needed for Vertigo.  Dose:  25 mg        CONTINUE taking these medications      Instructions   irbesartan 150 MG Tabs  Commonly known as:  AVAPRO   Take 150 mg by mouth every evening.  Dose:  150 mg     levothyroxine 100 MCG Tabs  Commonly known as:  SYNTHROID   Take 100 mcg by mouth every evening.  Dose:  100 mcg            Allergies  No Known Allergies    DIET  Orders Placed This Encounter   Procedures   • DIET ORDER     Standing Status:   Standing      Number of Occurrences:   1     Order Specific Question:   Diet:     Answer:   Regular [1]     Order Specific Question:   Diet:     Answer:   2 Gram Sodium [7]     Order Specific Question:   Macronutrient modifications:     Answer:   Low Cholesterol [7]       ACTIVITY  As tolerated.  Weight bearing as tolerated    CONSULTATIONS  None    PROCEDURES  None    LABORATORY  Lab Results   Component Value Date    SODIUM 136 06/10/2018    POTASSIUM 3.6 06/10/2018    CHLORIDE 107 06/10/2018    CO2 19 (L) 06/10/2018    GLUCOSE 104 (H) 06/10/2018    BUN 27 (H) 06/10/2018    CREATININE 1.26 06/10/2018        Lab Results   Component Value Date    WBC 9.1 06/10/2018    HEMOGLOBIN 11.5 (L) 06/10/2018    HEMATOCRIT 32.1 (L) 06/10/2018    PLATELETCT 220 06/10/2018        Total time of the discharge process exceeds 40 minutes.

## 2018-06-11 NOTE — DISCHARGE INSTRUCTIONS
Discharge Instructions    Discharged to home by car with relative. Discharged via wheelchair, hospital escort: Yes.  Special equipment needed: Not Applicable    Be sure to schedule a follow-up appointment with your primary care doctor or any specialists as instructed.     Discharge Plan:   Diet Plan: Discussed  Activity Level: Discussed  Confirmed Follow up Appointment: Patient to Call and Schedule Appointment  Confirmed Symptoms Management: Discussed  Medication Reconciliation Updated: Yes  Pneumococcal Vaccine Administered/Refused: Not given - Patient refused pneumococcal vaccine  Influenza Vaccine Indication: Patient Refuses    I understand that a diet low in cholesterol, fat, and sodium is recommended for good health. Unless I have been given specific instructions below for another diet, I accept this instruction as my diet prescription.   Other diet: diet as tolerated        · Is patient discharged on Warfarin / Coumadin?   No       Vertigo  Introduction  Vertigo means that you feel like you are moving when you are not. Vertigo can also make you feel like things around you are moving when they are not. This feeling can come and go at any time. Vertigo often goes away on its own.  Follow these instructions at home:  · Avoid making fast movements.  · Avoid driving.  · Avoid using heavy machinery.  · Avoid doing any task or activity that might cause danger to you or other people if you would have a vertigo attack while you are doing it.  · Sit down right away if you feel dizzy or have trouble with your balance.  · Take over-the-counter and prescription medicines only as told by your doctor.  · Follow instructions from your doctor about which positions or movements you should avoid.  · Drink enough fluid to keep your pee (urine) clear or pale yellow.  · Keep all follow-up visits as told by your doctor. This is important.  Contact a doctor if:  · Medicine does not help your vertigo.  · You have a fever.  · Your  problems get worse or you have new symptoms.  · Your family or friends see changes in your behavior.  · You feel sick to your stomach (nauseous) or you throw up (vomit).  · You have a “pins and needles” feeling or you are numb in part of your body.  Get help right away if:  · You have trouble moving or talking.  · You are always dizzy.  · You pass out (faint).  · You get very bad headaches.  · You feel weak or have trouble using your hands, arms, or legs.  · You have changes in your hearing.  · You have changes in your seeing (vision).  · You get a stiff neck.  · Bright light starts to bother you.  This information is not intended to replace advice given to you by your health care provider. Make sure you discuss any questions you have with your health care provider.  Document Released: 09/26/2009 Document Revised: 05/25/2017 Document Reviewed: 04/11/2016  © 2017 [employer]      Depression / Suicide Risk    As you are discharged from this [facility] facility, it is important to learn how to keep safe from harming yourself.    Recognize the warning signs:  · Abrupt changes in personality, positive or negative- including increase in energy   · Giving away possessions  · Change in eating patterns- significant weight changes-  positive or negative  · Change in sleeping patterns- unable to sleep or sleeping all the time   · Unwillingness or inability to communicate  · Depression  · Unusual sadness, discouragement and loneliness  · Talk of wanting to die  · Neglect of personal appearance   · Rebelliousness- reckless behavior  · Withdrawal from people/activities they love  · Confusion- inability to concentrate     If you or a loved one observes any of these behaviors or has concerns about self-harm, here's what you can do:  · Talk about it- your feelings and reasons for harming yourself  · Remove any means that you might use to hurt yourself (examples: pills, rope, extension cords, firearm)  · Get professional help from  the community (Mental Health, Substance Abuse, psychological counseling)  · Do not be alone:Call your Safe Contact- someone whom you trust who will be there for you.  · Call your local CRISIS HOTLINE 192-7777 or 594-974-7881  · Call your local Children's Mobile Crisis Response Team [place] (752) 821-2034 or www.[employer]  · Call the toll free National Suicide Prevention Hotlines   · National Suicide Prevention Lifeline 550-642-UNFS (2290)  · National Hope Line Network 800-SUICIDE (747-3853)

## 2018-06-11 NOTE — PROGRESS NOTES
Admit to 303-1 from ED. Oriented to room and equipment. Alert and oriented. Neuro check completed, no deficits noted. Assessment completed. IVF hung and infusing without issue. Aware NPO. States still mild nausea, no dizziness at this time. X1 assist to bathroom, gait unsteady at times. Position for comfort. Call light in reach.

## 2018-06-11 NOTE — PROGRESS NOTES
Continues to rest. Respirations even and unlabored. Repositions self. IV infusing without issue. Call light in reach.     Tele Report:   Sinus Rhythm/ Sinus Terence  Rare PVC  HR 50-70  0.24/ 0.08/ 0.40

## 2018-06-11 NOTE — CARE PLAN
Problem: Communication  Goal: The ability to communicate needs accurately and effectively will improve    Intervention: [place] patient and significant other/support system to call light to alert staff of needs   06/11/18 8600   OTHER   Oriented to: All of the Following : Location of Bathroom, Visiting Policy, Unit Routine, Call Light and Bedside Controls, Bedside Rail Policy, Smoking Policy, Rights and Responsibilities, Bedside Report, and Patient Education Notebook

## 2018-06-11 NOTE — ED NOTES
Pt transported to room 303 bed 1 on [facility] with cardiac monitor and all belongings. Bedside report to Chanelle RODRÍGUEZ.

## 2018-06-11 NOTE — CARE PLAN
Problem: Safety  Goal: Will remain free from injury    Intervention: Provide assistance with mobility   06/11/18 0905   OTHER   Assistance / Tolerance Standby Assist;Assistance of One;General Weakness;Tolerates Well

## 2018-06-11 NOTE — PROGRESS NOTES
Resting with eyes closed. Respirations even and unlabored. No c/o. IV infusing without issue. Call light in reach.

## 2018-07-23 ENCOUNTER — OFFICE VISIT (OUTPATIENT)
Dept: MEDICAL GROUP | Facility: MEDICAL CENTER | Age: 83
End: 2018-07-23
Payer: COMMERCIAL

## 2018-07-23 VITALS
SYSTOLIC BLOOD PRESSURE: 110 MMHG | HEART RATE: 80 BPM | TEMPERATURE: 97.9 F | WEIGHT: 153.6 LBS | HEIGHT: 63 IN | DIASTOLIC BLOOD PRESSURE: 42 MMHG | OXYGEN SATURATION: 95 % | BODY MASS INDEX: 27.21 KG/M2

## 2018-07-23 DIAGNOSIS — D64.9 ANEMIA, UNSPECIFIED TYPE: ICD-10-CM

## 2018-07-23 DIAGNOSIS — I10 ESSENTIAL HYPERTENSION: ICD-10-CM

## 2018-07-23 DIAGNOSIS — R73.03 PREDIABETES: ICD-10-CM

## 2018-07-23 DIAGNOSIS — N18.30 CKD (CHRONIC KIDNEY DISEASE) STAGE 3, GFR 30-59 ML/MIN (HCC): ICD-10-CM

## 2018-07-23 DIAGNOSIS — E03.9 ACQUIRED HYPOTHYROIDISM: ICD-10-CM

## 2018-07-23 PROCEDURE — 99214 OFFICE O/P EST MOD 30 MIN: CPT | Performed by: FAMILY MEDICINE

## 2018-07-23 RX ORDER — LEVOTHYROXINE SODIUM 75 MCG
75 TABLET ORAL
Qty: 90 TAB | Refills: 3 | Status: SHIPPED | OUTPATIENT
Start: 2018-07-23 | End: 2018-09-25 | Stop reason: SDUPTHER

## 2018-07-23 NOTE — ASSESSMENT & PLAN NOTE
This is a chronic health problem for this patient for which she is on levothyroxine 100 mcg daily.  Her TSH is suppressed at 0.170 and her free T4 is elevated at 1.51 with a T3 of 72.8.  Patient has gone about 4 weeks without an episode of vertigo but that was because of some changes they made with her last episode.  Patient states that she gets through her day without feeling as if she has to lay down.  I am going to lower her dose slightly on her levothyroxine and we will recheck in 8 weeks.

## 2018-07-23 NOTE — ASSESSMENT & PLAN NOTE
This is a chronic health problem that is well controlled with current medications and lifestyle measures.  In the past patient's blood sugars have been elevated on a fasting blood sample her most recent blood work shows that her fasting is down to 85.  She will continue with current lifestyle.

## 2018-07-23 NOTE — ASSESSMENT & PLAN NOTE
This is a chronic health problem that is still present.  Patient is mildly anemic with a hemoglobin of 11.7 and a hematocrit of 34.7.  More than likely this is age-related and she does have some hemorrhoidal issues.  We are not going to work this up at age 89.

## 2018-07-23 NOTE — PROGRESS NOTES
CC:Diagnoses of Acquired hypothyroidism, Essential hypertension, Anemia, unspecified type, Prediabetes, and CKD (chronic kidney disease) stage 3, GFR 30-59 ml/min were pertinent to this visit.      HISTORY OF PRESENT ILLNESS: Patient is a 89 y.o. female established patient who presents today to follow-up on her chronic health problems.  Patient is accompanied by her son Prashanth with whom she resides.  She also had her blood work done prior to the visit.    Health Maintenance: Completed    Hypothyroidism  This is a chronic health problem for this patient for which she is on levothyroxine 100 mcg daily.  Her TSH is suppressed at 0.170 and her free T4 is elevated at 1.51 with a T3 of 72.8.  Patient has gone about 4 weeks without an episode of vertigo but that was because of some changes they made with her last episode.  Patient states that she gets through her day without feeling as if she has to lay down.  I am going to lower her dose slightly on her levothyroxine and we will recheck in 8 weeks.    Essential hypertension  This is a chronic health problem that is well controlled with current medications and lifestyle measures. The patient denies chest pain, shortness of breath or dyspnea on exertion.      Anemia  This is a chronic health problem that is still present.  Patient is mildly anemic with a hemoglobin of 11.7 and a hematocrit of 34.7.  More than likely this is age-related and she does have some hemorrhoidal issues.  We are not going to work this up at age 89.    Prediabetes  This is a chronic health problem that is well controlled with current medications and lifestyle measures.  In the past patient's blood sugars have been elevated on a fasting blood sample her most recent blood work shows that her fasting is down to 85.  She will continue with current lifestyle.    CKD (chronic kidney disease) stage 3, GFR 30-59 ml/min  Stable, will continue with current meds      Patient Active Problem List    Diagnosis  Date Noted   • Vertigo 06/10/2018   • Nausea & vomiting 06/10/2018   • Non-intractable cyclical vomiting with nausea 06/04/2018   • CKD (chronic kidney disease) stage 3, GFR 30-59 ml/min 01/11/2017   • Hyperlipidemia 05/14/2013   • Essential hypertension 05/14/2013   • Other malaise and fatigue 05/14/2013   • Hypothyroidism 05/14/2013   • Anemia 05/14/2013   • Arthropathy 05/14/2013   • Prediabetes 05/14/2013      Allergies:Patient has no known allergies.    Current Outpatient Prescriptions   Medication Sig Dispense Refill   • SYNTHROID 75 MCG Tab Take 1 Tab by mouth Every morning on an empty stomach. 90 Tab 3   • aspirin EC 81 MG EC tablet Take 1 Tab by mouth every day. 30 Tab 2   • irbesartan (AVAPRO) 150 MG Tab Take 150 mg by mouth every evening.       No current facility-administered medications for this visit.        Social History   Substance Use Topics   • Smoking status: Never Smoker   • Smokeless tobacco: Never Used   • Alcohol use No      Comment: occasional     Social History     Social History Narrative   • No narrative on file       Family History   Problem Relation Age of Onset   • Heart Disease Mother      MI   • Other Father      old age   • Alcohol/Drug Sister      alcoholism       Review of Systems:      - Constitutional: Negative for fever, chills, unexpected weight change, and fatigue/generalized weakness.     - HEENT: Negative for headaches, vision changes, hearing changes, ear pain, ear discharge, rhinorrhea, sinus congestion, sore throat, and neck pain.      - Respiratory: Negative for cough, sputum production, chest congestion, dyspnea, wheezing, and crackles.      - Cardiovascular: Negative for chest pain, palpitations, orthopnea, and bilateral lower extremity edema.     - Gastrointestinal: Negative for heartburn, nausea, vomiting, abdominal pain, hematochezia, melena, diarrhea, constipation, and greasy/foul-smelling stools.     - Genitourinary: Negative for dysuria, polyuria, hematuria,  "pyuria, urinary urgency, and urinary incontinence.    - Musculoskeletal: Negative for myalgias, back pain, and joint pain.     - Skin: Negative for rash, itching, cyanotic skin color change.     - Neurological: Negative for dizziness, tingling, tremors, focal sensory deficit, focal weakness and headaches.     - Endo/Heme/Allergies: Does not bruise/bleed easily.     - Psychiatric/Behavioral: Negative for depression, suicidal/homicidal ideation and memory loss.          - NOTE: All other systems reviewed and are negative, except as in HPI.    Exam:    Blood pressure 110/42, pulse 80, temperature 36.6 °C (97.9 °F), height 1.6 m (5' 3\"), weight 69.7 kg (153 lb 9.6 oz), SpO2 95 %. Body mass index is 27.21 kg/m².    General:  Well nourished, well developed female in NAD  LABS:  6/8/18: Results reviewed and discussed with the patient, questions answered.    Patient was seen for 25 minutes face to face of which, 20 minutes was spent counseling regarding the above mentioned problems.  Assessment/Plan:  1. Acquired hypothyroidism  Uncontrolled, patient's TSH is suppressed which indicates that her Synthroid dose at 100 mcg is too high.  We will lower her dose to 75 mcg and recheck in 8 weeks and see her back in the office to discuss those results.  - TSH; Future  - FREE THYROXINE; Future  - TRIIDOTHYRONINE; Future    2. Essential hypertension  Controlled, continue with current meds and lifestyle.      3. Anemia, unspecified type  Uncontrolled, patient's hemoglobin is 11.7 and hematocrit is 34.7.  I suspect this is anemia of chronic disease and age.  Patient does have some hemorrhoidal problems.  We will monitor for now.    4. Prediabetes  Controlled, patient's blood sugars now down to 85 with most recent blood work.  We will continue to monitor every 3-3-6 months.    5. CKD (chronic kidney disease) stage 3, GFR 30-59 ml/min  Uncontrolled, patient's at her baseline we will continue to monitor.        "

## 2018-09-19 ENCOUNTER — HOSPITAL ENCOUNTER (OUTPATIENT)
Dept: LAB | Facility: MEDICAL CENTER | Age: 83
End: 2018-09-19
Attending: FAMILY MEDICINE
Payer: COMMERCIAL

## 2018-09-19 DIAGNOSIS — E03.9 ACQUIRED HYPOTHYROIDISM: ICD-10-CM

## 2018-09-20 LAB
T3 SERPL-MCNC: 77 NG/DL (ref 71–180)
T4 FREE SERPL-MCNC: 1.35 NG/DL (ref 0.82–1.77)
TSH SERPL DL<=0.005 MIU/L-ACNC: 0.55 UIU/ML (ref 0.45–4.5)

## 2018-09-25 ENCOUNTER — OFFICE VISIT (OUTPATIENT)
Dept: MEDICAL GROUP | Facility: MEDICAL CENTER | Age: 83
End: 2018-09-25
Payer: COMMERCIAL

## 2018-09-25 VITALS
HEIGHT: 63 IN | BODY MASS INDEX: 28.49 KG/M2 | HEART RATE: 60 BPM | SYSTOLIC BLOOD PRESSURE: 124 MMHG | OXYGEN SATURATION: 96 % | WEIGHT: 160.8 LBS | DIASTOLIC BLOOD PRESSURE: 56 MMHG | RESPIRATION RATE: 12 BRPM | TEMPERATURE: 99.1 F

## 2018-09-25 DIAGNOSIS — E03.9 ACQUIRED HYPOTHYROIDISM: ICD-10-CM

## 2018-09-25 DIAGNOSIS — I10 ESSENTIAL HYPERTENSION: ICD-10-CM

## 2018-09-25 DIAGNOSIS — D64.9 ANEMIA, UNSPECIFIED TYPE: ICD-10-CM

## 2018-09-25 DIAGNOSIS — Z23 NEED FOR INFLUENZA VACCINATION: ICD-10-CM

## 2018-09-25 DIAGNOSIS — G43.A0 CYCLICAL VOMITING WITH NAUSEA, INTRACTABILITY OF VOMITING NOT SPECIFIED: ICD-10-CM

## 2018-09-25 PROBLEM — R11.2 NAUSEA & VOMITING: Status: RESOLVED | Noted: 2018-06-10 | Resolved: 2018-09-25

## 2018-09-25 PROCEDURE — G0008 ADMIN INFLUENZA VIRUS VAC: HCPCS | Performed by: FAMILY MEDICINE

## 2018-09-25 PROCEDURE — 90662 IIV NO PRSV INCREASED AG IM: CPT | Performed by: FAMILY MEDICINE

## 2018-09-25 PROCEDURE — 99214 OFFICE O/P EST MOD 30 MIN: CPT | Mod: 25 | Performed by: FAMILY MEDICINE

## 2018-09-25 RX ORDER — LEVOTHYROXINE SODIUM 75 MCG
75 TABLET ORAL
Qty: 90 TAB | Refills: 3 | Status: SHIPPED | OUTPATIENT
Start: 2018-09-25 | End: 2019-06-05 | Stop reason: SDUPTHER

## 2018-09-25 NOTE — ASSESSMENT & PLAN NOTE
This was a chronic problem that went on for nearly 6 weeks.  This now has resolved.  Patient has regained the weight she had lost.  We will resolve this issue.

## 2018-09-25 NOTE — ASSESSMENT & PLAN NOTE
This is a chronic health problem for this patient that is well-controlled with current medications.  Blood pressure 124/56.  Her O2 sat was excellent at 96%.. The patient denies chest pain, shortness of breath or dyspnea on exertion.

## 2018-09-25 NOTE — ASSESSMENT & PLAN NOTE
This was a chronic problem identified back in June when the patient was having more problems with episodic dizziness and stomach upset along with nausea and vomiting.  Patient went through about a 6 weeks.  Of that which is thought to have been a virus.  She now is completely better and has regained the 7 pounds that she had loss.  Patient states that she feels fine is no longer having any problems with her stomach.  We will plan to recheck a CBC with her next set of labs.

## 2018-09-25 NOTE — PROGRESS NOTES
CC:Diagnoses of Need for influenza vaccination, Anemia, unspecified type, Essential hypertension, Cyclical vomiting with nausea, intractability of vomiting not specified, and Acquired hypothyroidism were pertinent to this visit.    HISTORY OF PRESENT ILLNESS: Patient is a 89 y.o. female established patient who presents today to follow-up on chronic health problems as outlined below.  Patient did her blood tests which were for thyroid and those results are addressed below.  Patient reports that she is doing much better than the last time I saw her when she was still having episodic nausea and vomiting.  Evidently that was some type of a viral infection that now has completely cleared.  Patient is alert and oriented x3 and able to tell me about her daily activities.  She does reside with her son who accompanies her today.    Health Maintenance: Completed    Anemia  This was a chronic problem identified back in June when the patient was having more problems with episodic dizziness and stomach upset along with nausea and vomiting.  Patient went through about a 6 weeks.  Of that which is thought to have been a virus.  She now is completely better and has regained the 7 pounds that she had loss.  Patient states that she feels fine is no longer having any problems with her stomach.  We will plan to recheck a CBC with her next set of labs.    Essential hypertension  This is a chronic health problem for this patient that is well-controlled with current medications.  Blood pressure 124/56.  Her O2 sat was excellent at 96%.. The patient denies chest pain, shortness of breath or dyspnea on exertion.      Nausea & vomiting  This was a chronic problem that went on for nearly 6 weeks.  This now has resolved.  Patient has regained the weight she had lost.  We will resolve this issue.    Hypothyroidism  This is a chronic health problem for which the patient is on Synthroid 75 mcg daily.  This has her TSH adequately replaced with a  level of 0.554, T3 normal at 77 and T4 is normal at 1.35.  We will have her continue with current meds long-term.      Patient Active Problem List    Diagnosis Date Noted   • Vertigo 06/10/2018   • Non-intractable cyclical vomiting with nausea 06/04/2018   • CKD (chronic kidney disease) stage 3, GFR 30-59 ml/min 01/11/2017   • Hyperlipidemia 05/14/2013   • Essential hypertension 05/14/2013   • Other malaise and fatigue 05/14/2013   • Hypothyroidism 05/14/2013   • Anemia 05/14/2013   • Arthropathy 05/14/2013   • Prediabetes 05/14/2013      Allergies:Patient has no known allergies.    Current Outpatient Prescriptions   Medication Sig Dispense Refill   • SYNTHROID 75 MCG Tab Take 1 Tab by mouth Every morning on an empty stomach. 90 Tab 3   • aspirin EC 81 MG EC tablet Take 1 Tab by mouth every day. 30 Tab 2   • irbesartan (AVAPRO) 150 MG Tab Take 150 mg by mouth every evening.       No current facility-administered medications for this visit.        Social History   Substance Use Topics   • Smoking status: Never Smoker   • Smokeless tobacco: Never Used   • Alcohol use No      Comment: occasional     Social History     Social History Narrative   • No narrative on file       Family History   Problem Relation Age of Onset   • Heart Disease Mother         MI   • Other Father         old age   • Alcohol/Drug Sister         alcoholism        ROS:     - Constitutional:  Negative for fever, chills, unexpected weight change, and fatigue/generalized weakness.    - HEENT:  Negative for headaches, vision changes, hearing changes, ear pain, ear discharge, rhinorrhea, sinus congestion, sore throat, and neck pain.      - Respiratory: Negative for cough, sputum production, chest congestion, dyspnea, wheezing, and crackles.      - Cardiovascular: Negative for chest pain, palpitations, orthopnea, and bilateral lower extremity edema.     - Gastrointestinal: Negative for heartburn, nausea, vomiting, abdominal pain, hematochezia, melena,  "diarrhea, constipation, and greasy/foul-smelling stools.     - Genitourinary: Negative for dysuria, polyuria, hematuria, pyuria, urinary urgency, and urinary incontinence.     - Musculoskeletal: Positive for joint pains particularly in her hands and knees bilaterally.  Otherwise denies myalgias, back pain, and joint pain.     - Skin: Negative for rash, itching, cyanotic skin color change.     - Neurological: Negative for dizziness, tingling, tremors, focal sensory deficit, focal weakness and headaches.     - Endo/Heme/Allergies: Does not bruise/bleed easily.     - Psychiatric/Behavioral: Negative for depression, suicidal/homicidal ideation and memory loss.          - NOTE: All other systems reviewed and are negative, except as in HPI.      Exam:    Blood pressure 124/56, pulse 60, temperature 37.3 °C (99.1 °F), temperature source Temporal, resp. rate 12, height 1.6 m (5' 3\"), weight 72.9 kg (160 lb 12.8 oz), SpO2 96 %, not currently breastfeeding. Body mass index is 28.48 kg/m².    General:  Well nourished, well developed female in NAD  Head is grossly normal.  Neck: Supple without JVD or bruit. Thyroid is not enlarged.  Pulmonary: Clear to ausculation and percussion.  Normal effort. No rales, ronchi, or wheezing.  Cardiovascular: Regular rate and rhythm without murmur. Carotid and radial pulses are intact and equal bilaterally.  Extremities: no clubbing, cyanosis, or edema.  LABS: 9/19/18: Results reviewed and discussed with the patient, questions answered.    Please note that this dictation was created using voice recognition software. I have made every reasonable attempt to correct obvious errors, but I expect that there are errors of grammar and possibly content that I did not discover before finalizing the note.    Assessment/Plan:  1. Need for influenza vaccination  Given today  - INFLUENZA VACCINE, HIGH DOSE (65+ ONLY)    2. Anemia, unspecified type  Uncontrolled, last blood work done in June 2018 showed her " hemoglobin was down to 11.7.  In light of her recovering from this episodic nausea and vomiting I suspect her hemoglobin and hematocrit will be back in the normal range.  We will plan to recheck that in 3 months prior to her return.  - COMP METABOLIC PANEL; Future  - CBC WITH DIFFERENTIAL; Future    3. Essential hypertension  Controlled, continue with current meds and lifestyle.      4. Cyclical vomiting with nausea, intractability of vomiting not specified  This problem has now completely resolved.    5. Acquired hypothyroidism  Controlled, continue with current meds and lifestyle.

## 2018-09-25 NOTE — ASSESSMENT & PLAN NOTE
This is a chronic health problem for which the patient is on Synthroid 75 mcg daily.  This has her TSH adequately replaced with a level of 0.554, T3 normal at 77 and T4 is normal at 1.35.  We will have her continue with current meds long-term.

## 2018-10-22 RX ORDER — IRBESARTAN 150 MG/1
150 TABLET ORAL DAILY
Qty: 90 TAB | Refills: 3 | Status: SHIPPED | OUTPATIENT
Start: 2018-10-22 | End: 2019-06-05

## 2019-01-02 ENCOUNTER — OFFICE VISIT (OUTPATIENT)
Dept: MEDICAL GROUP | Facility: MEDICAL CENTER | Age: 84
End: 2019-01-02
Payer: COMMERCIAL

## 2019-01-02 VITALS
SYSTOLIC BLOOD PRESSURE: 124 MMHG | BODY MASS INDEX: 28.32 KG/M2 | WEIGHT: 159.83 LBS | OXYGEN SATURATION: 95 % | DIASTOLIC BLOOD PRESSURE: 62 MMHG | HEART RATE: 58 BPM | HEIGHT: 63 IN | RESPIRATION RATE: 16 BRPM | TEMPERATURE: 98.2 F

## 2019-01-02 DIAGNOSIS — D64.9 ANEMIA, UNSPECIFIED TYPE: ICD-10-CM

## 2019-01-02 DIAGNOSIS — R73.03 PREDIABETES: ICD-10-CM

## 2019-01-02 DIAGNOSIS — I10 ESSENTIAL HYPERTENSION: ICD-10-CM

## 2019-01-02 DIAGNOSIS — M12.9 ARTHROPATHY: ICD-10-CM

## 2019-01-02 PROCEDURE — 99214 OFFICE O/P EST MOD 30 MIN: CPT | Performed by: FAMILY MEDICINE

## 2019-01-02 ASSESSMENT — PATIENT HEALTH QUESTIONNAIRE - PHQ9: CLINICAL INTERPRETATION OF PHQ2 SCORE: 0

## 2019-01-02 NOTE — ASSESSMENT & PLAN NOTE
This is a chronic health problem for this patient that is well-controlled with current meds.  Blood pressure is 124/62.  We will have her continue with current meds.  We will plan on seeing her back in about 6 months.  Certainly would see her sooner if she is having any problems. The patient denies chest pain, shortness of breath or dyspnea on exertion.

## 2019-01-02 NOTE — ASSESSMENT & PLAN NOTE
This is a chronic health problem that is well controlled with current medications and lifestyle measures.  Patient states that she is not having joint pain at this time.  She feels like she is doing very well overall.

## 2019-01-02 NOTE — ASSESSMENT & PLAN NOTE
This patient is here today to follow-up on her anemia.  She did not get the blood work drawn.  We will plan to give her a set of labs to get drawn at her convenience and then we will send a letter with those results.  My concern is that when she was having problems with nausea and vomiting her hemoglobin had dropped to 11.7.  I want to make certain that that has recovered and that we do not have some chronic blood loss that were not aware of.  Patient states that she feels very well.

## 2019-01-02 NOTE — ASSESSMENT & PLAN NOTE
This is a chronic health problem that is well controlled with current lifestyle measures. She is careful with her diet and plans to continue with this.

## 2019-01-02 NOTE — PROGRESS NOTES
CC:Diagnoses of Anemia, unspecified type, Arthropathy, Essential hypertension, and Prediabetes were pertinent to this visit.    HISTORY OF PRESENT ILLNESS: Patient is a 89 y.o. female established patient who presents today to follow-up on chronic health problems.  Unfortunately this patient and her son with whom she resides forgot to get her blood work done.  She would like to plan to do that 6 months down the road.  She does tell me that overall she feels well.  She is 89 years of age but her mother lived to be 98 and she expects to do the same.    Health Maintenance: Completed    Anemia  This patient is here today to follow-up on her anemia.  She did not get the blood work drawn.  We will plan to give her a set of labs to get drawn at her convenience and then we will send a letter with those results.  My concern is that when she was having problems with nausea and vomiting her hemoglobin had dropped to 11.7.  I want to make certain that that has recovered and that we do not have some chronic blood loss that were not aware of.  Patient states that she feels very well.    Arthropathy  This is a chronic health problem that is well controlled with current medications and lifestyle measures.  Patient states that she is not having joint pain at this time.  She feels like she is doing very well overall.    Essential hypertension  This is a chronic health problem for this patient that is well-controlled with current meds.  Blood pressure is 124/62.  We will have her continue with current meds.  We will plan on seeing her back in about 6 months.  Certainly would see her sooner if she is having any problems. The patient denies chest pain, shortness of breath or dyspnea on exertion.      Prediabetes  This is a chronic health problem that is well controlled with current lifestyle measures. She is careful with her diet and plans to continue with this.      Patient Active Problem List    Diagnosis Date Noted   • Vertigo  06/10/2018   • Non-intractable cyclical vomiting with nausea 06/04/2018   • CKD (chronic kidney disease) stage 3, GFR 30-59 ml/min ([facility]) 01/11/2017   • Hyperlipidemia 05/14/2013   • Essential hypertension 05/14/2013   • Other malaise and fatigue 05/14/2013   • Hypothyroidism 05/14/2013   • Anemia 05/14/2013   • Arthropathy 05/14/2013   • Prediabetes 05/14/2013      Allergies:Patient has no known allergies.    Current Outpatient Prescriptions   Medication Sig Dispense Refill   • irbesartan (AVAPRO) 150 MG Tab Take 1 Tab by mouth every day. 90 Tab 3   • SYNTHROID 75 MCG Tab Take 1 Tab by mouth Every morning on an empty stomach. 90 Tab 3   • aspirin EC 81 MG EC tablet Take 1 Tab by mouth every day. 30 Tab 2     No current facility-administered medications for this visit.        Social History   Substance Use Topics   • Smoking status: Never Smoker   • Smokeless tobacco: Never Used   • Alcohol use No      Comment: occasional     Social History     Social History Narrative   • No narrative on file       Family History   Problem Relation Age of Onset   • Heart Disease Mother         MI   • Other Father         old age   • Alcohol/Drug Sister         alcoholism        ROS:     - Constitutional:  Negative for fever, chills, unexpected weight change, and fatigue/generalized weakness.    - HEENT:  Negative for headaches, vision changes, hearing changes, ear pain, ear discharge, rhinorrhea, sinus congestion, sore throat, and neck pain.      - Respiratory: Negative for cough, sputum production, chest congestion, dyspnea, wheezing, and crackles.      - Cardiovascular: Negative for chest pain, palpitations, orthopnea, and bilateral lower extremity edema.     - Gastrointestinal: Negative for heartburn, nausea, vomiting, abdominal pain, hematochezia, melena, diarrhea, constipation, and greasy/foul-smelling stools.     - Genitourinary: Negative for dysuria, polyuria, hematuria, pyuria, urinary urgency, and urinary incontinence.      "- Musculoskeletal: Negative for myalgias, back pain, and joint pain.     - Skin: Negative for rash, itching, cyanotic skin color change.     - Neurological: Negative for dizziness, tingling, tremors, focal sensory deficit, focal weakness and headaches.     - Endo/Heme/Allergies: Does not bruise/bleed easily.     - Psychiatric/Behavioral: Negative for depression, suicidal/homicidal ideation and memory loss.          - NOTE: All other systems reviewed and are negative, except as in HPI.      Exam:    Blood pressure 124/62, pulse (!) 58, temperature 36.8 °C (98.2 °F), temperature source Temporal, resp. rate 16, height 1.6 m (5' 3\"), weight 72.5 kg (159 lb 13.3 oz), SpO2 95 %, not currently breastfeeding. Body mass index is 28.31 kg/m².    General:  Well nourished, well developed female in NAD  Head is grossly normal.  Neck: Supple without JVD or bruit. Thyroid is not enlarged.  Pulmonary: Clear to ausculation and percussion.  Normal effort. No rales, ronchi, or wheezing.  Cardiovascular: Regular rate and rhythm without murmur. Carotid and radial pulses are intact and equal bilaterally.  Extremities: no clubbing, cyanosis, or edema.    Please note that this dictation was created using voice recognition software. I have made every reasonable attempt to correct obvious errors, but I expect that there are errors of grammar and possibly content that I did not discover before finalizing the note.    Assessment/Plan:  1. Anemia, unspecified type  Uncontrolled, patient will get blood work in the next 6 months and we will follow-up on her anemia.  Hopefully this has now completely resolved.  - CBC WITHOUT DIFFERENTIAL; Future    2. Arthropathy  Well-controlled with current lifestyle and medications.    3. Essential hypertension  Controlled, continue with current meds and lifestyle.      4. Prediabetes  Patient now eating carefully and her son helps with her diet to make certain that she gets good healthy meals.  We will plan to " recheck her blood sugar in 6 months.  - COMP METABOLIC PANEL; Future  - HEMOGLOBIN A1C; Future

## 2019-01-31 ENCOUNTER — APPOINTMENT (OUTPATIENT)
Dept: RADIOLOGY | Facility: MEDICAL CENTER | Age: 84
DRG: 470 | End: 2019-01-31
Attending: EMERGENCY MEDICINE
Payer: COMMERCIAL

## 2019-01-31 ENCOUNTER — HOSPITAL ENCOUNTER (INPATIENT)
Facility: MEDICAL CENTER | Age: 84
LOS: 5 days | DRG: 470 | End: 2019-02-05
Attending: EMERGENCY MEDICINE | Admitting: HOSPITALIST
Payer: COMMERCIAL

## 2019-01-31 DIAGNOSIS — S72.001A CLOSED FRACTURE OF RIGHT HIP, INITIAL ENCOUNTER (HCC): ICD-10-CM

## 2019-01-31 DIAGNOSIS — R42 VERTIGO: ICD-10-CM

## 2019-01-31 DIAGNOSIS — S72.001A CLOSED DISPLACED FRACTURE OF RIGHT FEMORAL NECK (HCC): ICD-10-CM

## 2019-01-31 LAB
ANION GAP SERPL CALC-SCNC: 11 MMOL/L (ref 0–11.9)
BASOPHILS # BLD AUTO: 1 % (ref 0–1.8)
BASOPHILS # BLD: 0.09 K/UL (ref 0–0.12)
BUN SERPL-MCNC: 33 MG/DL (ref 8–22)
CALCIUM SERPL-MCNC: 9.3 MG/DL (ref 8.4–10.2)
CHLORIDE SERPL-SCNC: 104 MMOL/L (ref 96–112)
CO2 SERPL-SCNC: 20 MMOL/L (ref 20–33)
CREAT SERPL-MCNC: 1.4 MG/DL (ref 0.5–1.4)
EKG IMPRESSION: NORMAL
EOSINOPHIL # BLD AUTO: 0.16 K/UL (ref 0–0.51)
EOSINOPHIL NFR BLD: 1.8 % (ref 0–6.9)
ERYTHROCYTE [DISTWIDTH] IN BLOOD BY AUTOMATED COUNT: 40.5 FL (ref 35.9–50)
GLUCOSE SERPL-MCNC: 100 MG/DL (ref 65–99)
HCT VFR BLD AUTO: 35.5 % (ref 37–47)
HGB BLD-MCNC: 12.4 G/DL (ref 12–16)
IMM GRANULOCYTES # BLD AUTO: 0.1 K/UL (ref 0–0.11)
IMM GRANULOCYTES NFR BLD AUTO: 1.1 % (ref 0–0.9)
INR PPP: 1.02 (ref 0.87–1.13)
LYMPHOCYTES # BLD AUTO: 2.6 K/UL (ref 1–4.8)
LYMPHOCYTES NFR BLD: 29.3 % (ref 22–41)
MAGNESIUM SERPL-MCNC: 1.8 MG/DL (ref 1.5–2.5)
MCH RBC QN AUTO: 32.4 PG (ref 27–33)
MCHC RBC AUTO-ENTMCNC: 34.9 G/DL (ref 33.6–35)
MCV RBC AUTO: 92.7 FL (ref 81.4–97.8)
MONOCYTES # BLD AUTO: 0.87 K/UL (ref 0–0.85)
MONOCYTES NFR BLD AUTO: 9.8 % (ref 0–13.4)
NEUTROPHILS # BLD AUTO: 5.04 K/UL (ref 2–7.15)
NEUTROPHILS NFR BLD: 57 % (ref 44–72)
NRBC # BLD AUTO: 0 K/UL
NRBC BLD-RTO: 0 /100 WBC
PLATELET # BLD AUTO: 199 K/UL (ref 164–446)
PMV BLD AUTO: 9.1 FL (ref 9–12.9)
POTASSIUM SERPL-SCNC: 4 MMOL/L (ref 3.6–5.5)
PROTHROMBIN TIME: 13.3 SEC (ref 12–14.6)
RBC # BLD AUTO: 3.83 M/UL (ref 4.2–5.4)
SODIUM SERPL-SCNC: 135 MMOL/L (ref 135–145)
WBC # BLD AUTO: 8.9 K/UL (ref 4.8–10.8)

## 2019-01-31 PROCEDURE — 72170 X-RAY EXAM OF PELVIS: CPT

## 2019-01-31 PROCEDURE — 700111 HCHG RX REV CODE 636 W/ 250 OVERRIDE (IP)

## 2019-01-31 PROCEDURE — 36415 COLL VENOUS BLD VENIPUNCTURE: CPT

## 2019-01-31 PROCEDURE — 85610 PROTHROMBIN TIME: CPT

## 2019-01-31 PROCEDURE — 770006 HCHG ROOM/CARE - MED/SURG/GYN SEMI*

## 2019-01-31 PROCEDURE — 83735 ASSAY OF MAGNESIUM: CPT

## 2019-01-31 PROCEDURE — A9270 NON-COVERED ITEM OR SERVICE: HCPCS | Performed by: HOSPITALIST

## 2019-01-31 PROCEDURE — 71045 X-RAY EXAM CHEST 1 VIEW: CPT

## 2019-01-31 PROCEDURE — 85025 COMPLETE CBC W/AUTO DIFF WBC: CPT

## 2019-01-31 PROCEDURE — 73552 X-RAY EXAM OF FEMUR 2/>: CPT | Mod: RT

## 2019-01-31 PROCEDURE — 99222 1ST HOSP IP/OBS MODERATE 55: CPT | Performed by: HOSPITALIST

## 2019-01-31 PROCEDURE — 700102 HCHG RX REV CODE 250 W/ 637 OVERRIDE(OP): Performed by: HOSPITALIST

## 2019-01-31 PROCEDURE — 80048 BASIC METABOLIC PNL TOTAL CA: CPT

## 2019-01-31 PROCEDURE — 700101 HCHG RX REV CODE 250

## 2019-01-31 PROCEDURE — 99285 EMERGENCY DEPT VISIT HI MDM: CPT

## 2019-01-31 PROCEDURE — 93005 ELECTROCARDIOGRAM TRACING: CPT | Performed by: EMERGENCY MEDICINE

## 2019-01-31 RX ORDER — IRBESARTAN 150 MG/1
150 TABLET ORAL DAILY
Status: DISCONTINUED | OUTPATIENT
Start: 2019-02-01 | End: 2019-02-05 | Stop reason: HOSPADM

## 2019-01-31 RX ORDER — OXYCODONE HYDROCHLORIDE 5 MG/1
2.5 TABLET ORAL
Status: DISCONTINUED | OUTPATIENT
Start: 2019-01-31 | End: 2019-02-01

## 2019-01-31 RX ORDER — HYDROMORPHONE HYDROCHLORIDE 1 MG/ML
0.25 INJECTION, SOLUTION INTRAMUSCULAR; INTRAVENOUS; SUBCUTANEOUS
Status: DISCONTINUED | OUTPATIENT
Start: 2019-01-31 | End: 2019-02-01

## 2019-01-31 RX ORDER — HEPARIN SODIUM 5000 [USP'U]/ML
5000 INJECTION, SOLUTION INTRAVENOUS; SUBCUTANEOUS EVERY 8 HOURS
Status: DISCONTINUED | OUTPATIENT
Start: 2019-01-31 | End: 2019-02-01

## 2019-01-31 RX ORDER — ACETAMINOPHEN 325 MG/1
650 TABLET ORAL EVERY 6 HOURS PRN
Status: DISCONTINUED | OUTPATIENT
Start: 2019-01-31 | End: 2019-02-05 | Stop reason: HOSPADM

## 2019-01-31 RX ORDER — POLYETHYLENE GLYCOL 3350 17 G/17G
1 POWDER, FOR SOLUTION ORAL
Status: DISCONTINUED | OUTPATIENT
Start: 2019-01-31 | End: 2019-02-01

## 2019-01-31 RX ORDER — LEVOTHYROXINE SODIUM 0.07 MG/1
75 TABLET ORAL
Status: DISCONTINUED | OUTPATIENT
Start: 2019-02-01 | End: 2019-02-05 | Stop reason: HOSPADM

## 2019-01-31 RX ORDER — OXYCODONE HYDROCHLORIDE 5 MG/1
5 TABLET ORAL
Status: DISCONTINUED | OUTPATIENT
Start: 2019-01-31 | End: 2019-02-01

## 2019-01-31 RX ORDER — AMOXICILLIN 250 MG
2 CAPSULE ORAL 2 TIMES DAILY
Status: DISCONTINUED | OUTPATIENT
Start: 2019-01-31 | End: 2019-02-01

## 2019-01-31 RX ORDER — BISACODYL 10 MG
10 SUPPOSITORY, RECTAL RECTAL
Status: DISCONTINUED | OUTPATIENT
Start: 2019-01-31 | End: 2019-02-01

## 2019-01-31 RX ADMIN — ACETAMINOPHEN 650 MG: 325 TABLET, FILM COATED ORAL at 21:09

## 2019-01-31 ASSESSMENT — ENCOUNTER SYMPTOMS
HEMOPTYSIS: 0
SENSORY CHANGE: 0
STRIDOR: 0
BLURRED VISION: 0
MYALGIAS: 1
HEADACHES: 0
ORTHOPNEA: 0
COUGH: 0
PND: 0
NAUSEA: 0
EYE PAIN: 0
VOMITING: 0
TINGLING: 0
TREMORS: 0
CONSTIPATION: 0
FEVER: 0
SHORTNESS OF BREATH: 0
DEPRESSION: 0
HEARTBURN: 0
DOUBLE VISION: 0
NERVOUS/ANXIOUS: 0
NECK PAIN: 0
DIZZINESS: 0
PHOTOPHOBIA: 0
WEAKNESS: 0
BACK PAIN: 0
CLAUDICATION: 0
CHILLS: 0
MEMORY LOSS: 0
PALPITATIONS: 0
SPUTUM PRODUCTION: 0
BLOOD IN STOOL: 0
SPEECH CHANGE: 0
SORE THROAT: 0

## 2019-01-31 ASSESSMENT — PATIENT HEALTH QUESTIONNAIRE - PHQ9
SUM OF ALL RESPONSES TO PHQ9 QUESTIONS 1 AND 2: 0
1. LITTLE INTEREST OR PLEASURE IN DOING THINGS: NOT AT ALL
2. FEELING DOWN, DEPRESSED, IRRITABLE, OR HOPELESS: NOT AT ALL

## 2019-01-31 ASSESSMENT — LIFESTYLE VARIABLES
HAVE YOU EVER FELT YOU SHOULD CUT DOWN ON YOUR DRINKING: NO
HOW MANY TIMES IN THE PAST YEAR HAVE YOU HAD 5 OR MORE DRINKS IN A DAY: 0
CONSUMPTION TOTAL: NEGATIVE
EVER_SMOKED: NEVER
ON A TYPICAL DAY WHEN YOU DRINK ALCOHOL HOW MANY DRINKS DO YOU HAVE: 1
EVER HAD A DRINK FIRST THING IN THE MORNING TO STEADY YOUR NERVES TO GET RID OF A HANGOVER: NO
AVERAGE NUMBER OF DAYS PER WEEK YOU HAVE A DRINK CONTAINING ALCOHOL: 1
TOTAL SCORE: 0
EVER FELT BAD OR GUILTY ABOUT YOUR DRINKING: NO
HAVE PEOPLE ANNOYED YOU BY CRITICIZING YOUR DRINKING: NO
ALCOHOL_USE: YES

## 2019-01-31 ASSESSMENT — COPD QUESTIONNAIRES
COPD SCREENING SCORE: 2
DURING THE PAST 4 WEEKS HOW MUCH DID YOU FEEL SHORT OF BREATH: NONE/LITTLE OF THE TIME
IN THE PAST 12 MONTHS DO YOU DO LESS THAN YOU USED TO BECAUSE OF YOUR BREATHING PROBLEMS: DISAGREE/UNSURE
DO YOU EVER COUGH UP ANY MUCUS OR PHLEGM?: NO/ONLY WITH OCCASIONAL COLDS OR INFECTIONS
HAVE YOU SMOKED AT LEAST 100 CIGARETTES IN YOUR ENTIRE LIFE: NO/DON'T KNOW

## 2019-01-31 ASSESSMENT — COGNITIVE AND FUNCTIONAL STATUS - GENERAL
TURNING FROM BACK TO SIDE WHILE IN FLAT BAD: A LOT
DRESSING REGULAR UPPER BODY CLOTHING: A LITTLE
PERSONAL GROOMING: A LITTLE
SUGGESTED CMS G CODE MODIFIER DAILY ACTIVITY: CK
SUGGESTED CMS G CODE MODIFIER MOBILITY: CL
EATING MEALS: A LITTLE
CLIMB 3 TO 5 STEPS WITH RAILING: A LOT
TOILETING: A LITTLE
HELP NEEDED FOR BATHING: A LITTLE
WALKING IN HOSPITAL ROOM: A LOT
MOVING TO AND FROM BED TO CHAIR: A LOT
MOVING FROM LYING ON BACK TO SITTING ON SIDE OF FLAT BED: A LOT
DAILY ACTIVITIY SCORE: 19
MOBILITY SCORE: 12
STANDING UP FROM CHAIR USING ARMS: A LOT

## 2019-01-31 NOTE — ED NOTES
Med Rec completed per Son at bedside  Allergies reviewed  No ORAL antibiotics in last 30 days

## 2019-01-31 NOTE — ED PROVIDER NOTES
ED Provider Note    CHIEF COMPLAINT  Chief Complaint   Patient presents with   • T-5000 GLF     Fell getting out of car, landing on R hip.  +external rotation and shortening. Pain R lateral hip.       HPI  Gigner Laura is a 89 y.o. female who presents after mechanical ground-level fall; patient was getting the car when she fell onto her right hip, she was unable to bear weight at this point.  She denies any neck or back pain.  She denies any other extremity pain.  Patient denies any other weakness or numbness.  She reports she is not on any blood thinners.  Patient was caught by her son prior to falling and reports she did not strike her head.    REVIEW OF SYSTEMS  Review of Systems   Musculoskeletal: Positive for joint pain.       See HPI for further details. All other systems are negative.     PAST MEDICAL HISTORY   has a past medical history of Anemia; Arthritis; CKD (chronic kidney disease) stage 3, GFR 30-59 ml/min ([facility]) (1/11/2017); HEMATURIA; Hyperglycemia (5/14/2013); Hyperlipidemia; Hypertension; Hypothyroid; Non-intractable cyclical vomiting with nausea (6/4/2018); and Prediabetes (5/14/2013).    SOCIAL HISTORY  Social History     Social History Main Topics   • Smoking status: Never Smoker   • Smokeless tobacco: Never Used   • Alcohol use No      Comment: occasional   • Drug use: No   • Sexual activity: No      Comment:  in 1974, home loans       SURGICAL HISTORY   has a past surgical history that includes hysterectomy, vaginal and tonsillectomy.    CURRENT MEDICATIONS  Home Medications    **Home medications have not yet been reviewed for this encounter**         ALLERGIES  No Known Allergies    PHYSICAL EXAM  Physical Exam   Constitutional: She is oriented to person, place, and time. She appears well-developed and well-nourished.   HENT:   Head: Normocephalic and atraumatic.   Eyes: Conjunctivae are normal.   Neck: Normal range of motion. Neck supple.   Cardiovascular: Normal rate and  regular rhythm.    Pulmonary/Chest: Effort normal and breath sounds normal.   Abdominal: Soft. Bowel sounds are normal. She exhibits no distension. There is no tenderness. There is no rebound.   Musculoskeletal:   CT L-spine without any tenderness to palpation or bony abnormality, bilateral upper extremities are unremarkable for range of motion all major joints.  Left lower extremity is unremarkable.  Right lower extremity is shortened and rotated, pain provoked on logroll.  No pain at knee or ankle.  Distal pulses are 2+ cap refill is instantaneous and sensation is intact throughout.  Pelvis stable   Neurological: She is alert and oriented to person, place, and time.   Skin: Skin is warm and dry. No rash noted.   Psychiatric: She has a normal mood and affect. Her behavior is normal.         DIAGNOSTIC STUDIES / PROCEDURES    EKG  Normal sinus rhythm normal axis normal intervals no ST changes consistent with acute regional ischemia    LABS  Results for orders placed or performed during the hospital encounter of 01/31/19   CBC WITH DIFFERENTIAL   Result Value Ref Range    WBC 8.9 4.8 - 10.8 K/uL    RBC 3.83 (L) 4.20 - 5.40 M/uL    Hemoglobin 12.4 12.0 - 16.0 g/dL    Hematocrit 35.5 (L) 37.0 - 47.0 %    MCV 92.7 81.4 - 97.8 fL    MCH 32.4 27.0 - 33.0 pg    MCHC 34.9 33.6 - 35.0 g/dL    RDW 40.5 35.9 - 50.0 fL    Platelet Count 199 164 - 446 K/uL    MPV 9.1 9.0 - 12.9 fL    Neutrophils-Polys 57.00 44.00 - 72.00 %    Lymphocytes 29.30 22.00 - 41.00 %    Monocytes 9.80 0.00 - 13.40 %    Eosinophils 1.80 0.00 - 6.90 %    Basophils 1.00 0.00 - 1.80 %    Immature Granulocytes 1.10 (H) 0.00 - 0.90 %    Nucleated RBC 0.00 /100 WBC    Neutrophils (Absolute) 5.04 2.00 - 7.15 K/uL    Lymphs (Absolute) 2.60 1.00 - 4.80 K/uL    Monos (Absolute) 0.87 (H) 0.00 - 0.85 K/uL    Eos (Absolute) 0.16 0.00 - 0.51 K/uL    Baso (Absolute) 0.09 0.00 - 0.12 K/uL    Immature Granulocytes (abs) 0.10 0.00 - 0.11 K/uL    NRBC (Absolute) 0.00 K/uL    BASIC METABOLIC PANEL   Result Value Ref Range    Sodium 135 135 - 145 mmol/L    Potassium 4.0 3.6 - 5.5 mmol/L    Chloride 104 96 - 112 mmol/L    Co2 20 20 - 33 mmol/L    Glucose 100 (H) 65 - 99 mg/dL    Bun 33 (H) 8 - 22 mg/dL    Creatinine 1.40 0.50 - 1.40 mg/dL    Calcium 9.3 8.4 - 10.2 mg/dL    Anion Gap 11.0 0.0 - 11.9   PT/INR   Result Value Ref Range    PT 13.3 12.0 - 14.6 sec    INR 1.02 0.87 - 1.13   ESTIMATED GFR   Result Value Ref Range    GFR If  42 (A) >60 mL/min/1.73 m 2    GFR If Non African American 35 (A) >60 mL/min/1.73 m 2   Magnesium   Result Value Ref Range    Magnesium 1.8 1.5 - 2.5 mg/dL   EKG   Result Value Ref Range    Report       [facility]    Test Date:  2019  Pt Name:    WESTON SANCHES                 Department: [facility]  MRN:        [id number]                      [facility]  Gender:     Female                       Technician: [id number]  :        [id number]                   Requested By:SUDHEER BUTLER  Order #:    [id number]                    Reading MD: Nemesio Sánchez MD    Measurements  Intervals                                Axis  Rate:       71                           P:          21  GA:         226                          QRS:        26  QRSD:       98                           T:          58  QT:         417  QTc:        454    Interpretive Statements  Normal sinus rhythm normal axis normal intervals no ST changes consistent  with  acute regional ischemia    Electronically Signed On 2019 15:59:43 PST by Nemesio Sánchez MD           RADIOLOGY  DX-CHEST-LIMITED (1 VIEW)   Final Result      1.  Enlarged cardiac silhouette with probable vascular congestion.   2.  Right paratracheal fullness may be due to vascular prominence.      DX-FEMUR-2+ RIGHT   Final Result      Right femoral neck fracture with displacement      DX-PELVIS-1 OR 2 VIEWS   Final Result      Displaced right femoral neck fracture           COURSE & MEDICAL DECISION MAKING  Pertinent Labs & Imaging studies reviewed. (See chart for details)  Patient here with exam consistent with hip fracture versus dislocation.  Will check x-ray.  Patient is currently refusing any pain medication.  Neck back cleared clinically.  Patient reports there is no head trauma, there is no evidence of head trauma on exam, head is been cleared clinically.  Basic labs and EKG checked for preop purposes.  Chest abdomen cleared clinically.  Pelvis is stable.  X-ray reveals displaced fracture of patient's hip, I discussed the case with orthopedics and hospitalist.  Patient will be admitted to the hospitalist given her advanced age.  Orthopedics will place patient on the schedule.  No further recommendations from orthopedics.      FINAL IMPRESSION  1.  1. Closed fracture of right hip, initial encounter (HCC)               Electronically signed by: Gonzalo Herr, 1/31/2019 3:23 PM

## 2019-01-31 NOTE — ED TRIAGE NOTES
Ginger Laura 89 y.o. female   Chief Complaint   Patient presents with   • T-5000 GLF     Fell getting out of car, landing on R hip.  +external rotation and shortening. Pain R lateral hip.      Assisted out of car, onto gurney with slide board.  Assured pelvis stability prior to transfer.     Clothing cut off.  Changed into gown.  Chart placed for ERP eval.

## 2019-02-01 ENCOUNTER — APPOINTMENT (OUTPATIENT)
Dept: RADIOLOGY | Facility: MEDICAL CENTER | Age: 84
DRG: 470 | End: 2019-02-01
Attending: HOSPITALIST
Payer: COMMERCIAL

## 2019-02-01 ENCOUNTER — APPOINTMENT (OUTPATIENT)
Dept: RADIOLOGY | Facility: MEDICAL CENTER | Age: 84
DRG: 470 | End: 2019-02-01
Attending: ORTHOPAEDIC SURGERY
Payer: COMMERCIAL

## 2019-02-01 LAB
ALBUMIN SERPL BCP-MCNC: 3.3 G/DL (ref 3.2–4.9)
ALBUMIN/GLOB SERPL: 1.3 G/DL
ALP SERPL-CCNC: 54 U/L (ref 30–99)
ALT SERPL-CCNC: 14 U/L (ref 2–50)
ANION GAP SERPL CALC-SCNC: 10 MMOL/L (ref 0–11.9)
AST SERPL-CCNC: 23 U/L (ref 12–45)
BASOPHILS # BLD AUTO: 0.6 % (ref 0–1.8)
BASOPHILS # BLD: 0.06 K/UL (ref 0–0.12)
BILIRUB SERPL-MCNC: 1.3 MG/DL (ref 0.1–1.5)
BUN SERPL-MCNC: 26 MG/DL (ref 8–22)
CALCIUM SERPL-MCNC: 8.4 MG/DL (ref 8.4–10.2)
CHLORIDE SERPL-SCNC: 107 MMOL/L (ref 96–112)
CO2 SERPL-SCNC: 18 MMOL/L (ref 20–33)
CREAT SERPL-MCNC: 1.34 MG/DL (ref 0.5–1.4)
EOSINOPHIL # BLD AUTO: 0.18 K/UL (ref 0–0.51)
EOSINOPHIL NFR BLD: 1.8 % (ref 0–6.9)
ERYTHROCYTE [DISTWIDTH] IN BLOOD BY AUTOMATED COUNT: 40.9 FL (ref 35.9–50)
GLOBULIN SER CALC-MCNC: 2.5 G/DL (ref 1.9–3.5)
GLUCOSE SERPL-MCNC: 121 MG/DL (ref 65–99)
HCT VFR BLD AUTO: 31.1 % (ref 37–47)
HGB BLD-MCNC: 10.8 G/DL (ref 12–16)
IMM GRANULOCYTES # BLD AUTO: 0.05 K/UL (ref 0–0.11)
IMM GRANULOCYTES NFR BLD AUTO: 0.5 % (ref 0–0.9)
INR PPP: 1.13 (ref 0.87–1.13)
LYMPHOCYTES # BLD AUTO: 1.32 K/UL (ref 1–4.8)
LYMPHOCYTES NFR BLD: 13.6 % (ref 22–41)
MCH RBC QN AUTO: 32.7 PG (ref 27–33)
MCHC RBC AUTO-ENTMCNC: 34.7 G/DL (ref 33.6–35)
MCV RBC AUTO: 94.2 FL (ref 81.4–97.8)
MONOCYTES # BLD AUTO: 0.82 K/UL (ref 0–0.85)
MONOCYTES NFR BLD AUTO: 8.4 % (ref 0–13.4)
NEUTROPHILS # BLD AUTO: 7.31 K/UL (ref 2–7.15)
NEUTROPHILS NFR BLD: 75.1 % (ref 44–72)
NRBC # BLD AUTO: 0 K/UL
NRBC BLD-RTO: 0 /100 WBC
PLATELET # BLD AUTO: 173 K/UL (ref 164–446)
PMV BLD AUTO: 9.2 FL (ref 9–12.9)
POTASSIUM SERPL-SCNC: 3.9 MMOL/L (ref 3.6–5.5)
PROT SERPL-MCNC: 5.8 G/DL (ref 6–8.2)
PROTHROMBIN TIME: 14.4 SEC (ref 12–14.6)
RBC # BLD AUTO: 3.3 M/UL (ref 4.2–5.4)
SODIUM SERPL-SCNC: 135 MMOL/L (ref 135–145)
WBC # BLD AUTO: 9.7 K/UL (ref 4.8–10.8)

## 2019-02-01 PROCEDURE — 501838 HCHG SUTURE GENERAL: Performed by: ORTHOPAEDIC SURGERY

## 2019-02-01 PROCEDURE — 160009 HCHG ANES TIME/MIN: Performed by: ORTHOPAEDIC SURGERY

## 2019-02-01 PROCEDURE — 160036 HCHG PACU - EA ADDL 30 MINS PHASE I: Performed by: ORTHOPAEDIC SURGERY

## 2019-02-01 PROCEDURE — 73501 X-RAY EXAM HIP UNI 1 VIEW: CPT | Mod: RT

## 2019-02-01 PROCEDURE — 700111 HCHG RX REV CODE 636 W/ 250 OVERRIDE (IP): Performed by: ANESTHESIOLOGY

## 2019-02-01 PROCEDURE — 160035 HCHG PACU - 1ST 60 MINS PHASE I: Performed by: ORTHOPAEDIC SURGERY

## 2019-02-01 PROCEDURE — 700111 HCHG RX REV CODE 636 W/ 250 OVERRIDE (IP): Performed by: HOSPITALIST

## 2019-02-01 PROCEDURE — 502000 HCHG MISC OR IMPLANTS RC 0278: Performed by: ORTHOPAEDIC SURGERY

## 2019-02-01 PROCEDURE — 700102 HCHG RX REV CODE 250 W/ 637 OVERRIDE(OP): Performed by: ANESTHESIOLOGY

## 2019-02-01 PROCEDURE — 770006 HCHG ROOM/CARE - MED/SURG/GYN SEMI*

## 2019-02-01 PROCEDURE — L8699 PROSTHETIC IMPLANT NOS: HCPCS | Performed by: ORTHOPAEDIC SURGERY

## 2019-02-01 PROCEDURE — 85025 COMPLETE CBC W/AUTO DIFF WBC: CPT

## 2019-02-01 PROCEDURE — 160031 HCHG SURGERY MINUTES - 1ST 30 MINS LEVEL 5: Performed by: ORTHOPAEDIC SURGERY

## 2019-02-01 PROCEDURE — 80053 COMPREHEN METABOLIC PANEL: CPT

## 2019-02-01 PROCEDURE — 302135 SEQUENTIAL COMPRESSION MACHINE: Performed by: ORTHOPAEDIC SURGERY

## 2019-02-01 PROCEDURE — 160048 HCHG OR STATISTICAL LEVEL 1-5: Performed by: ORTHOPAEDIC SURGERY

## 2019-02-01 PROCEDURE — A9270 NON-COVERED ITEM OR SERVICE: HCPCS | Performed by: ANESTHESIOLOGY

## 2019-02-01 PROCEDURE — 700101 HCHG RX REV CODE 250

## 2019-02-01 PROCEDURE — 700102 HCHG RX REV CODE 250 W/ 637 OVERRIDE(OP): Performed by: HOSPITALIST

## 2019-02-01 PROCEDURE — 99232 SBSQ HOSP IP/OBS MODERATE 35: CPT | Performed by: INTERNAL MEDICINE

## 2019-02-01 PROCEDURE — 85610 PROTHROMBIN TIME: CPT

## 2019-02-01 PROCEDURE — 160022 HCHG BLOCK: Performed by: ORTHOPAEDIC SURGERY

## 2019-02-01 PROCEDURE — 160002 HCHG RECOVERY MINUTES (STAT): Performed by: ORTHOPAEDIC SURGERY

## 2019-02-01 PROCEDURE — 160042 HCHG SURGERY MINUTES - EA ADDL 1 MIN LEVEL 5: Performed by: ORTHOPAEDIC SURGERY

## 2019-02-01 PROCEDURE — A9270 NON-COVERED ITEM OR SERVICE: HCPCS | Performed by: HOSPITALIST

## 2019-02-01 PROCEDURE — 700111 HCHG RX REV CODE 636 W/ 250 OVERRIDE (IP)

## 2019-02-01 PROCEDURE — 502578 HCHG PACK, TOTAL HIP: Performed by: ORTHOPAEDIC SURGERY

## 2019-02-01 PROCEDURE — 0SR9019 REPLACEMENT OF RIGHT HIP JOINT WITH METAL SYNTHETIC SUBSTITUTE, CEMENTED, OPEN APPROACH: ICD-10-PCS | Performed by: ORTHOPAEDIC SURGERY

## 2019-02-01 PROCEDURE — 73610 X-RAY EXAM OF ANKLE: CPT | Mod: LT

## 2019-02-01 PROCEDURE — 36415 COLL VENOUS BLD VENIPUNCTURE: CPT

## 2019-02-01 DEVICE — IMPLANTABLE DEVICE: Type: IMPLANTABLE DEVICE | Site: HIP | Status: FUNCTIONAL

## 2019-02-01 DEVICE — IMPLANT COCR 12/14 FEM HEAD 28 +0: Type: IMPLANTABLE DEVICE | Site: HIP | Status: FUNCTIONAL

## 2019-02-01 DEVICE — IMPLANT CONQ FX FEM COMP SZ 9: Type: IMPLANTABLE DEVICE | Site: HIP | Status: FUNCTIONAL

## 2019-02-01 DEVICE — IMPLANT TANDEM BIPOLAR COCR 47OD 28ID: Type: IMPLANTABLE DEVICE | Site: HIP | Status: FUNCTIONAL

## 2019-02-01 DEVICE — BONE CEMENT SIMPLEX ANTIBIO - (10/PK): Type: IMPLANTABLE DEVICE | Site: HIP | Status: FUNCTIONAL

## 2019-02-01 DEVICE — IMPLANT INVIS DIST CENT SZ 9MM (1EA): Type: IMPLANTABLE DEVICE | Site: HIP | Status: FUNCTIONAL

## 2019-02-01 RX ORDER — OXYCODONE HYDROCHLORIDE 5 MG/1
5 TABLET ORAL
Status: DISCONTINUED | OUTPATIENT
Start: 2019-02-01 | End: 2019-02-05 | Stop reason: HOSPADM

## 2019-02-01 RX ORDER — HALOPERIDOL 5 MG/ML
1 INJECTION INTRAMUSCULAR EVERY 6 HOURS PRN
Status: DISCONTINUED | OUTPATIENT
Start: 2019-02-01 | End: 2019-02-05 | Stop reason: HOSPADM

## 2019-02-01 RX ORDER — HYDROMORPHONE HYDROCHLORIDE 1 MG/ML
0.5 INJECTION, SOLUTION INTRAMUSCULAR; INTRAVENOUS; SUBCUTANEOUS
Status: DISCONTINUED | OUTPATIENT
Start: 2019-02-01 | End: 2019-02-05 | Stop reason: HOSPADM

## 2019-02-01 RX ORDER — ONDANSETRON 2 MG/ML
4 INJECTION INTRAMUSCULAR; INTRAVENOUS EVERY 4 HOURS PRN
Status: DISCONTINUED | OUTPATIENT
Start: 2019-02-01 | End: 2019-02-05 | Stop reason: HOSPADM

## 2019-02-01 RX ORDER — BISACODYL 10 MG
10 SUPPOSITORY, RECTAL RECTAL
Status: DISCONTINUED | OUTPATIENT
Start: 2019-02-01 | End: 2019-02-05 | Stop reason: HOSPADM

## 2019-02-01 RX ORDER — OXYCODONE HCL 5 MG/5 ML
10 SOLUTION, ORAL ORAL
Status: COMPLETED | OUTPATIENT
Start: 2019-02-01 | End: 2019-02-01

## 2019-02-01 RX ORDER — DIPHENHYDRAMINE HYDROCHLORIDE 50 MG/ML
25 INJECTION INTRAMUSCULAR; INTRAVENOUS EVERY 6 HOURS PRN
Status: DISCONTINUED | OUTPATIENT
Start: 2019-02-01 | End: 2019-02-05 | Stop reason: HOSPADM

## 2019-02-01 RX ORDER — ACETAMINOPHEN 500 MG
1000 TABLET ORAL ONCE
Status: COMPLETED | OUTPATIENT
Start: 2019-02-01 | End: 2019-02-01

## 2019-02-01 RX ORDER — KETOROLAC TROMETHAMINE 30 MG/ML
15 INJECTION, SOLUTION INTRAMUSCULAR; INTRAVENOUS EVERY 6 HOURS
Status: DISCONTINUED | OUTPATIENT
Start: 2019-02-01 | End: 2019-02-01

## 2019-02-01 RX ORDER — DEXAMETHASONE SODIUM PHOSPHATE 4 MG/ML
INJECTION, SOLUTION INTRA-ARTICULAR; INTRALESIONAL; INTRAMUSCULAR; INTRAVENOUS; SOFT TISSUE
Status: DISPENSED
Start: 2019-02-01 | End: 2019-02-02

## 2019-02-01 RX ORDER — SCOLOPAMINE TRANSDERMAL SYSTEM 1 MG/1
1 PATCH, EXTENDED RELEASE TRANSDERMAL
Status: DISCONTINUED | OUTPATIENT
Start: 2019-02-01 | End: 2019-02-05 | Stop reason: HOSPADM

## 2019-02-01 RX ORDER — BUPIVACAINE HYDROCHLORIDE 2.5 MG/ML
INJECTION, SOLUTION EPIDURAL; INFILTRATION; INTRACAUDAL
Status: DISPENSED
Start: 2019-02-01 | End: 2019-02-02

## 2019-02-01 RX ORDER — ACETAMINOPHEN 325 MG/1
650 TABLET ORAL EVERY 6 HOURS
Status: DISCONTINUED | OUTPATIENT
Start: 2019-02-02 | End: 2019-02-05 | Stop reason: HOSPADM

## 2019-02-01 RX ORDER — SODIUM CHLORIDE, SODIUM LACTATE, POTASSIUM CHLORIDE, CALCIUM CHLORIDE 600; 310; 30; 20 MG/100ML; MG/100ML; MG/100ML; MG/100ML
INJECTION, SOLUTION INTRAVENOUS
Status: DISCONTINUED | OUTPATIENT
Start: 2019-02-01 | End: 2019-02-05 | Stop reason: HOSPADM

## 2019-02-01 RX ORDER — OXYCODONE HYDROCHLORIDE 10 MG/1
10 TABLET ORAL
Status: DISCONTINUED | OUTPATIENT
Start: 2019-02-01 | End: 2019-02-05 | Stop reason: HOSPADM

## 2019-02-01 RX ORDER — CEFAZOLIN SODIUM 1 G/3ML
INJECTION, POWDER, FOR SOLUTION INTRAMUSCULAR; INTRAVENOUS
Status: DISCONTINUED | OUTPATIENT
Start: 2019-02-01 | End: 2019-02-01 | Stop reason: HOSPADM

## 2019-02-01 RX ORDER — DOCUSATE SODIUM 100 MG/1
100 CAPSULE, LIQUID FILLED ORAL 2 TIMES DAILY
Status: DISCONTINUED | OUTPATIENT
Start: 2019-02-01 | End: 2019-02-05 | Stop reason: HOSPADM

## 2019-02-01 RX ORDER — OXYCODONE HCL 5 MG/5 ML
5 SOLUTION, ORAL ORAL
Status: COMPLETED | OUTPATIENT
Start: 2019-02-01 | End: 2019-02-01

## 2019-02-01 RX ORDER — POLYETHYLENE GLYCOL 3350 17 G/17G
1 POWDER, FOR SOLUTION ORAL 2 TIMES DAILY PRN
Status: DISCONTINUED | OUTPATIENT
Start: 2019-02-01 | End: 2019-02-05 | Stop reason: HOSPADM

## 2019-02-01 RX ORDER — ENEMA 19; 7 G/133ML; G/133ML
1 ENEMA RECTAL
Status: DISCONTINUED | OUTPATIENT
Start: 2019-02-01 | End: 2019-02-05 | Stop reason: HOSPADM

## 2019-02-01 RX ORDER — HALOPERIDOL 5 MG/ML
1 INJECTION INTRAMUSCULAR
Status: DISCONTINUED | OUTPATIENT
Start: 2019-02-01 | End: 2019-02-01 | Stop reason: HOSPADM

## 2019-02-01 RX ORDER — DEXAMETHASONE SODIUM PHOSPHATE 4 MG/ML
4 INJECTION, SOLUTION INTRA-ARTICULAR; INTRALESIONAL; INTRAMUSCULAR; INTRAVENOUS; SOFT TISSUE
Status: DISCONTINUED | OUTPATIENT
Start: 2019-02-01 | End: 2019-02-05 | Stop reason: HOSPADM

## 2019-02-01 RX ORDER — AMOXICILLIN 250 MG
1 CAPSULE ORAL
Status: DISCONTINUED | OUTPATIENT
Start: 2019-02-01 | End: 2019-02-05 | Stop reason: HOSPADM

## 2019-02-01 RX ORDER — SODIUM CHLORIDE, SODIUM LACTATE, POTASSIUM CHLORIDE, CALCIUM CHLORIDE 600; 310; 30; 20 MG/100ML; MG/100ML; MG/100ML; MG/100ML
INJECTION, SOLUTION INTRAVENOUS CONTINUOUS
Status: DISCONTINUED | OUTPATIENT
Start: 2019-02-01 | End: 2019-02-01 | Stop reason: HOSPADM

## 2019-02-01 RX ORDER — MIDAZOLAM HYDROCHLORIDE 1 MG/ML
1 INJECTION INTRAMUSCULAR; INTRAVENOUS
Status: DISCONTINUED | OUTPATIENT
Start: 2019-02-01 | End: 2019-02-01 | Stop reason: HOSPADM

## 2019-02-01 RX ORDER — AMOXICILLIN 250 MG
1 CAPSULE ORAL NIGHTLY
Status: DISCONTINUED | OUTPATIENT
Start: 2019-02-01 | End: 2019-02-05 | Stop reason: HOSPADM

## 2019-02-01 RX ADMIN — OXYCODONE HYDROCHLORIDE 5 MG: 5 TABLET ORAL at 00:05

## 2019-02-01 RX ADMIN — OXYCODONE HYDROCHLORIDE 5 MG: 5 SOLUTION ORAL at 20:24

## 2019-02-01 RX ADMIN — ACETAMINOPHEN 1000 MG: 500 TABLET, COATED ORAL at 17:11

## 2019-02-01 RX ADMIN — FENTANYL CITRATE 25 MCG: 50 INJECTION, SOLUTION INTRAMUSCULAR; INTRAVENOUS at 20:09

## 2019-02-01 RX ADMIN — FENTANYL CITRATE 25 MCG: 50 INJECTION, SOLUTION INTRAMUSCULAR; INTRAVENOUS at 20:01

## 2019-02-01 RX ADMIN — HYDROMORPHONE HYDROCHLORIDE 0.25 MG: 1 INJECTION, SOLUTION INTRAMUSCULAR; INTRAVENOUS; SUBCUTANEOUS at 01:44

## 2019-02-01 RX ADMIN — OXYCODONE HYDROCHLORIDE 2.5 MG: 5 TABLET ORAL at 14:20

## 2019-02-01 RX ADMIN — FENTANYL CITRATE 25 MCG: 50 INJECTION, SOLUTION INTRAMUSCULAR; INTRAVENOUS at 20:27

## 2019-02-01 ASSESSMENT — ENCOUNTER SYMPTOMS
ABDOMINAL PAIN: 0
WEAKNESS: 0
EYE REDNESS: 0
SHORTNESS OF BREATH: 0
SINUS PAIN: 0
VOMITING: 0
SORE THROAT: 0
DIZZINESS: 0
DIARRHEA: 0
EYE DISCHARGE: 0
NERVOUS/ANXIOUS: 1
COUGH: 0
FALLS: 1
NAUSEA: 0
HEADACHES: 0

## 2019-02-01 NOTE — DISCHARGE PLANNING
Care Transition Team Assessment    SW met with this patient and her son bedside.  Patient lives with her son and hopes to return home when medically able.  Patient has no HX of HH or SNF stay.  Patient reports being independent with ADLS and has no HX of any DME use.  Patient informed this SW that she saw her PCP:Jimmy approximately 3 weeks ago.      Information Source  Orientation : Oriented x 4  Information Given By: Patient, Relative  Informant's Name: Ginger/Arnold  Who is responsible for making decisions for patient? : Patient    Readmission Evaluation  Is this a readmission?: No    Elopement Risk  Legal Hold: No    Interdisciplinary Discharge Planning  Primary Care Physician: Jimmy  Lives with - Patient's Self Care Capacity: Adult Children    Discharge Preparedness  What is your plan after discharge?: Home with help  What are your discharge supports?: Child  Prior Functional Level: Independent with Activities of Daily Living    Functional Assesment  Prior Functional Level: Independent with Activities of Daily Living    Finances  Financial Barriers to Discharge: No  Prescription Coverage: Yes    Advance Directive  Advance Directive?: None    Domestic Abuse  Have you ever been the victim of abuse or violence?: No    Psychological Assessment  History of Substance Abuse: None  History of Psychiatric Problems: No    Discharge Risks or Barriers  Discharge risks or barriers?: No    Anticipated Discharge Information  Anticipated discharge disposition: Discharge needs currently unknown

## 2019-02-01 NOTE — PROGRESS NOTES
Paged regarding ground level fall and RIGHT femoral neck fracture  Admit to hospitalist, keep NPO after midnight for hemiarthroplasty tomorrow afternoon    Myles Sloan DO, MSc

## 2019-02-01 NOTE — PROGRESS NOTES
"Pt laying in bed. Pt c/o 6/10 pain. Medicated per MAR. CMS in RLE is intact. Pt positioned for comfort. Fall precautions in place.     0000: pt has new pain in L heel pt states \"it feels like lighting in my heel.\" medicated per MAR. Floated heel and applied ice pack. Pt made NPO per orders.    0100: pain in L heel persists. Paged Dr. Shankar for orders    0130: Orders received from Dr. Shankar for x-ray of L ankle.      145: pt to x-ray    0200: pt back from x-ray  "

## 2019-02-01 NOTE — CONSULTS
ORTHOPAEDIC SURGERY CONSULTATION NOTE   2/1/2019    The patient is a 89 y.o. female who presents to the ER with complaints of RIGHT hip pain after ground level fall. Orthopaedic consultation rendered to evaluate hip fracture. Denies any other pain currently. No numbness, paresthesias, loss of consciousness or other symptoms. Exam slightly limited as patient hard of hearing, even with aids in place.     Past Medical History:   Diagnosis Date   • Anemia    • Arthritis    • CKD (chronic kidney disease) stage 3, GFR 30-59 ml/min ([facility]) 1/11/2017   • HEMATURIA    • Hyperglycemia 5/14/2013   • Hyperlipidemia    • Hypertension    • Hypothyroid    • Non-intractable cyclical vomiting with nausea 6/4/2018   • Prediabetes 5/14/2013       Past Surgical History:   Procedure Laterality Date   • HYSTERECTOMY, VAGINAL     • TONSILLECTOMY         Medications  No current facility-administered medications on file prior to encounter.      Current Outpatient Prescriptions on File Prior to Encounter   Medication Sig Dispense Refill   • irbesartan (AVAPRO) 150 MG Tab Take 1 Tab by mouth every day. 90 Tab 3   • SYNTHROID 75 MCG Tab Take 1 Tab by mouth Every morning on an empty stomach. 90 Tab 3       Allergies  Patient has no known allergies.    ROS  RIGHT hip pain. All other systems were reviewed and found to be negative    Family History   Problem Relation Age of Onset   • Heart Disease Mother         MI   • Other Father         old age   • Alcohol/Drug Sister         alcoholism       Social History     Social History   • Marital status:      Spouse name: N/A   • Number of children: N/A   • Years of education: N/A     Social History Main Topics   • Smoking status: Never Smoker   • Smokeless tobacco: Never Used   • Alcohol use 0.0 oz/week      Comment: occasional   • Drug use: No   • Sexual activity: No      Comment:  in 1974, home loans     Other Topics Concern   • Not on file     Social History Narrative   • No narrative on  "file       Physical Exam  Vitals  Blood pressure 131/41, pulse 63, temperature 36.8 °C (98.3 °F), temperature source Oral, resp. rate 18, height 1.626 m (5' 4\"), weight 75.9 kg (167 lb 5.3 oz), SpO2 96 %, not currently breastfeeding.  General: Well Developed, Well Nourished, no acute distress, frail  Psychiatric: pleasant mood and affect, confused   HEENT: Normocephalic, atraumatic  Eyes: Anicteric, PERRLA, EOMI  Neck: Supple, nontender, no masses  Chest: Symmetric expansion of the chest wall, non-tender to palpation, no distress.  Heart: palpable peripheral pulses  Abdomen: Soft, NT, ND  Extremities:  RLE:   -skin warm, dry, intact, leg short & ER   -TTP at hip and GT, +pain with ROM/logroll    -SILT DP/SP/saph/sural nerves   -+TA/EHL/GCS/Peroneals   -pulses palpable, brisk capillary refill    Radiographs:  DX-ANKLE 3+ VIEWS LEFT   Final Result      No radiographic evidence of acute traumatic injury.      DX-CHEST-LIMITED (1 VIEW)   Final Result      1.  Enlarged cardiac silhouette with probable vascular congestion.   2.  Right paratracheal fullness may be due to vascular prominence.      DX-FEMUR-2+ RIGHT   Final Result      Right femoral neck fracture with displacement      DX-PELVIS-1 OR 2 VIEWS   Final Result      Displaced right femoral neck fracture          Laboratory Values  Recent Labs      01/31/19   1523  02/01/19   0508   WBC  8.9  9.7   RBC  3.83*  3.30*   HEMOGLOBIN  12.4  10.8*   HEMATOCRIT  35.5*  31.1*   MCV  92.7  94.2   MCH  32.4  32.7   MCHC  34.9  34.7   RDW  40.5  40.9   PLATELETCT  199  173   MPV  9.1  9.2     Recent Labs      01/31/19   1523  02/01/19   0508   SODIUM  135  135   POTASSIUM  4.0  3.9   CHLORIDE  104  107   CO2  20  18*   GLUCOSE  100*  121*   BUN  33*  26*     Recent Labs      01/31/19   1523   INR  1.02         Assessment/Plan:    89F status post ground level fall with RIGHT FNF  -plan for hemiarthroplasty later today pending OR availability     -admission: " inpatient  -antibiotics: periop planned  -analgesia: multimodal, limit narcs as able  -activity/WB: NWB RLE, bedrest for now until surg   -anticoagulation: held for OR, ok lovenox postop   -diet: NPO  -additional surgery: tentative plan for surgery this afternoon pending OR and surgeon availability, will update accordingly with plan, will get in touch with patient's son to discuss plan of care    I recommended operative treatment of her right hip fracture. Risks and benefits of surgery were discussed which include, but are not limited to bleeding, infection, neurovascular damage, malunion, nonunion, instability, limb length discrepancy, symptomatic hardware, DVT, PE, MI, Stroke and death. Benefits of surgery discussed included improved chance of union in acceptable alignment and early mobilization. We also discussed therapeutic alternatives to surgery, including non-operative management, which I did not recommend.    They understand these risks and benefits and wish to proceed. Keep NPO pending surgery. Please see operative note for detailed post-operative plan, including post-op weightbearing status.      Myles Sloan DO, MSc

## 2019-02-01 NOTE — H&P
Hospital Medicine History & Physical Note    Date of Service  1/31/2019    Primary Care Physician  Tia Marquez M.D.    Consultants  Myles Sloan D.O. Orthopaedic surgery     Code Status  Full    Chief Complaint  Chief Complaint   Patient presents with   • T-5000 GLF     Fell getting out of car, landing on R hip.  +external rotation and shortening. Pain R lateral hip.         History of Presenting Illness  89 y.o. Female with PMHx of CKD stage 3, HTN, Hypothyroidism,  who presented 1/31/2019 to the ER for evaluation of right hip pain, which occurred right after patient sustained a mechanical ground-level fall, hence, as she was falling, her son held her , but she sustained a external rotation of her limp and right afterwards experienced a  right side of her hip, began having significant 10 out of 10 pain in severity, worsened with movement.  She denies having any head impact or other trauma.    Fortunately on imaging on admission patient was noted to have Right femoral neck fracture with displacement where she previously had a hip arthroplasty.  At this point patient will be admitted for pain control, and orthopedic consultation for possible surgical intervention.       Review of Systems  Review of Systems   Constitutional: Negative for chills, fever and malaise/fatigue.   HENT: Negative for congestion, hearing loss, sore throat and tinnitus.    Eyes: Negative for blurred vision, double vision, photophobia and pain.   Respiratory: Negative for cough, hemoptysis, sputum production, shortness of breath and stridor.    Cardiovascular: Negative for chest pain, palpitations, orthopnea, claudication and PND.   Gastrointestinal: Negative for blood in stool, constipation, heartburn, melena, nausea and vomiting.   Genitourinary: Negative for dysuria, frequency and urgency.   Musculoskeletal: Positive for joint pain and myalgias. Negative for back pain and neck pain.   Neurological: Negative for dizziness, tingling,  tremors, sensory change, speech change, weakness and headaches.   Psychiatric/Behavioral: Negative for depression, memory loss and suicidal ideas. The patient is not nervous/anxious.        Past Medical History   has a past medical history of Anemia; Arthritis; CKD (chronic kidney disease) stage 3, GFR 30-59 ml/min (HCC) (1/11/2017); HEMATURIA; Hyperglycemia (5/14/2013); Hyperlipidemia; Hypertension; Hypothyroid; Non-intractable cyclical vomiting with nausea (6/4/2018); and Prediabetes (5/14/2013).    Surgical History   has a past surgical history that includes hysterectomy, vaginal and tonsillectomy.     Family History  family history includes Alcohol/Drug in her sister; Heart Disease in her mother; Other in her father.     Social History   reports that she has never smoked. She has never used smokeless tobacco. She reports that she does not drink alcohol or use drugs.    Allergies  No Known Allergies    Medications  Prior to Admission Medications   Prescriptions Last Dose Informant Patient Reported? Taking?   SYNTHROID 75 MCG Tab 1/31/2019 at AM Family Member No No   Sig: Take 1 Tab by mouth Every morning on an empty stomach.   irbesartan (AVAPRO) 150 MG Tab 1/31/2019 at AM Family Member No No   Sig: Take 1 Tab by mouth every day.      Facility-Administered Medications: None       Physical Exam  Temp:  [36.8 °C (98.2 °F)] 36.8 °C (98.2 °F)  Pulse:  [62-76] 74  Resp:  [20] 20  BP: (173)/(79) 173/79  SpO2:  [95 %-96 %] 96 %    Physical Exam   Constitutional: She is oriented to person, place, and time. She appears well-developed and well-nourished. No distress.   HENT:   Head: Normocephalic and atraumatic.   Mouth/Throat: No oropharyngeal exudate.   Eyes: Pupils are equal, round, and reactive to light. Conjunctivae are normal. Right eye exhibits no discharge. No scleral icterus.   Neck: Neck supple. No JVD present. No thyromegaly present.   Cardiovascular: Normal rate and intact distal pulses.    No murmur  heard.  Pulmonary/Chest: Effort normal and breath sounds normal. No stridor. No respiratory distress. She has no wheezes. She has no rales.   Abdominal: Soft. Bowel sounds are normal. She exhibits no distension. There is no tenderness. There is no rebound.   Musculoskeletal: She exhibits tenderness. She exhibits no edema.   Shorten and externally rotated right lower extremity, with pain in the hip upon even small movements, distal pulses intact DPP +2    Neurological: She is alert and oriented to person, place, and time. No cranial nerve deficit.   Skin: Skin is warm. She is not diaphoretic. No erythema.   Psychiatric: She has a normal mood and affect. Her behavior is normal. Thought content normal.   Nursing note and vitals reviewed.      Laboratory:  Recent Labs      01/31/19   1523   WBC  8.9   RBC  3.83*   HEMOGLOBIN  12.4   HEMATOCRIT  35.5*   MCV  92.7   MCH  32.4   MCHC  34.9   RDW  40.5   PLATELETCT  199   MPV  9.1     Recent Labs      01/31/19   1523   SODIUM  135   POTASSIUM  4.0   CHLORIDE  104   CO2  20   GLUCOSE  100*   BUN  33*   CREATININE  1.40   CALCIUM  9.3     Recent Labs      01/31/19   1523   GLUCOSE  100*     Recent Labs      01/31/19   1523   INR  1.02             No results for input(s): TROPONINI in the last 72 hours.    Urinalysis:    No results found     Imaging:  DX-CHEST-LIMITED (1 VIEW)   Final Result      1.  Enlarged cardiac silhouette with probable vascular congestion.   2.  Right paratracheal fullness may be due to vascular prominence.      DX-FEMUR-2+ RIGHT   Final Result      Right femoral neck fracture with displacement      DX-PELVIS-1 OR 2 VIEWS   Final Result      Displaced right femoral neck fracture            Assessment/Plan:  I anticipate this patient will require at least two midnights for appropriate medical management, necessitating inpatient admission.    Closed displaced fracture of right femoral neck (HCC)   Assessment & Plan    CXR: shows Right femoral neck fracture  with displacement  Orthopedics consulted, anticipate surgical intervention  Patient will be provided with IV pain control  Patient will also have physical and occupational therapy evaluation post surgical intervention upon clearance from orthopedics.     Hypothyroidism- (present on admission)   Assessment & Plan    Resume home dose of Synthroid 75 mcg daily     Essential hypertension- (present on admission)   Assessment & Plan    Well-controlled continue home dose of Avapro         VTE prophylaxis: Heparin sq

## 2019-02-02 ENCOUNTER — APPOINTMENT (OUTPATIENT)
Dept: RADIOLOGY | Facility: MEDICAL CENTER | Age: 84
DRG: 470 | End: 2019-02-02
Attending: INTERNAL MEDICINE
Payer: COMMERCIAL

## 2019-02-02 PROBLEM — R93.89 ABNORMAL CXR: Status: ACTIVE | Noted: 2019-02-02

## 2019-02-02 PROCEDURE — 97162 PT EVAL MOD COMPLEX 30 MIN: CPT

## 2019-02-02 PROCEDURE — A9270 NON-COVERED ITEM OR SERVICE: HCPCS | Performed by: ORTHOPAEDIC SURGERY

## 2019-02-02 PROCEDURE — 94760 N-INVAS EAR/PLS OXIMETRY 1: CPT

## 2019-02-02 PROCEDURE — 700111 HCHG RX REV CODE 636 W/ 250 OVERRIDE (IP): Performed by: ORTHOPAEDIC SURGERY

## 2019-02-02 PROCEDURE — 700112 HCHG RX REV CODE 229: Performed by: ORTHOPAEDIC SURGERY

## 2019-02-02 PROCEDURE — 770006 HCHG ROOM/CARE - MED/SURG/GYN SEMI*

## 2019-02-02 PROCEDURE — 700102 HCHG RX REV CODE 250 W/ 637 OVERRIDE(OP): Performed by: ORTHOPAEDIC SURGERY

## 2019-02-02 PROCEDURE — 700102 HCHG RX REV CODE 250 W/ 637 OVERRIDE(OP): Performed by: HOSPITALIST

## 2019-02-02 PROCEDURE — A9270 NON-COVERED ITEM OR SERVICE: HCPCS | Performed by: HOSPITALIST

## 2019-02-02 PROCEDURE — 71250 CT THORAX DX C-: CPT

## 2019-02-02 PROCEDURE — 99232 SBSQ HOSP IP/OBS MODERATE 35: CPT | Performed by: INTERNAL MEDICINE

## 2019-02-02 PROCEDURE — 700111 HCHG RX REV CODE 636 W/ 250 OVERRIDE (IP)

## 2019-02-02 PROCEDURE — 97165 OT EVAL LOW COMPLEX 30 MIN: CPT

## 2019-02-02 RX ORDER — CEFAZOLIN SODIUM IN 0.9 % NACL 2 G/100 ML
PLASTIC BAG, INJECTION (ML) INTRAVENOUS
Status: COMPLETED
Start: 2019-02-02 | End: 2019-02-02

## 2019-02-02 RX ADMIN — LEVOTHYROXINE SODIUM 75 MCG: 75 TABLET ORAL at 05:40

## 2019-02-02 RX ADMIN — ACETAMINOPHEN 650 MG: 325 TABLET, FILM COATED ORAL at 05:40

## 2019-02-02 RX ADMIN — DOCUSATE SODIUM 100 MG: 100 CAPSULE, LIQUID FILLED ORAL at 18:08

## 2019-02-02 RX ADMIN — OXYCODONE HYDROCHLORIDE 5 MG: 5 TABLET ORAL at 01:23

## 2019-02-02 RX ADMIN — DOCUSATE SODIUM 100 MG: 100 CAPSULE, LIQUID FILLED ORAL at 05:40

## 2019-02-02 RX ADMIN — CEFAZOLIN 2000 MG: 1 INJECTION, POWDER, FOR SOLUTION INTRAVENOUS at 00:57

## 2019-02-02 RX ADMIN — ACETAMINOPHEN 650 MG: 325 TABLET, FILM COATED ORAL at 18:08

## 2019-02-02 RX ADMIN — ENOXAPARIN SODIUM 40 MG: 100 INJECTION SUBCUTANEOUS at 10:07

## 2019-02-02 RX ADMIN — ACETAMINOPHEN 650 MG: 325 TABLET, FILM COATED ORAL at 01:04

## 2019-02-02 RX ADMIN — STANDARDIZED SENNA CONCENTRATE AND DOCUSATE SODIUM 1 TABLET: 8.6; 5 TABLET, FILM COATED ORAL at 01:04

## 2019-02-02 RX ADMIN — CEFAZOLIN 2000 MG: 1 INJECTION, POWDER, FOR SOLUTION INTRAVENOUS at 05:41

## 2019-02-02 ASSESSMENT — ACTIVITIES OF DAILY LIVING (ADL): TOILETING: INDEPENDENT

## 2019-02-02 ASSESSMENT — ENCOUNTER SYMPTOMS
DIARRHEA: 0
NAUSEA: 0
EYE REDNESS: 0
HEADACHES: 0
SORE THROAT: 0
SHORTNESS OF BREATH: 0
DIZZINESS: 0
EYE DISCHARGE: 0
NERVOUS/ANXIOUS: 0
VOMITING: 0
ABDOMINAL PAIN: 0
SINUS PAIN: 0
WEAKNESS: 0
COUGH: 0

## 2019-02-02 ASSESSMENT — GAIT ASSESSMENTS
DISTANCE (FEET): 75
ASSISTIVE DEVICE: FRONT WHEEL WALKER
GAIT LEVEL OF ASSIST: STAND BY ASSIST
DEVIATION: ANTALGIC;STEP TO

## 2019-02-02 NOTE — THERAPY
"Occupational Therapy Evaluation completed.   Functional Status:  Pt was able to complete ADL transfers from chair to bedside commode with CGA, needing verbal cues for safe FWW use. She was able to complete toileting on BSC with CGA and FWW to stand for perineal care. She required modA for LB dressing and was able to complete UB dressing with supervision while seated EOB. Pt presents with decreased activity tolerance, impaired balance during functional ambulation and transfers, decreased functional independence during ADLs, impacting safe participation in ADLs and transfers.     Pt was educated on role of OT and R posterior hemiarthroplasty hip precautions with ADLs and IADLs. Reviewed application of precautions and use of adaptive equipment for dressing, bathing, toileting, grooming, and general functional mobility in the home. Reviewed the use of reacher, sockaide, dressing stick, long handled shoe horn and long handled sponge, as well as shower chair and raised toilet seat to assist with ADL's. Provided pt with ADL equipment handout and suggestions on where to obtain needed items.  Pt was educated on covering incision with plastic wrap and skin tape for protection and to avoid direct water contact over dressing when showering. Pt appeared to understand education and verbalized understanding of all education provided.    Plan of Care: Will benefit from Occupational Therapy 3 times per week  Discharge Recommendations:  Equipment: Front-Wheel Walker, Shower Chair, Bedside Commode and Will Continue to Assess for Equipment Needs. Post-acute therapy Discharge to a transitional care facility for continued skilled therapy services. Pt lives with adult son who assisted with ADLs and IADls; pt reports her son works during the day and she is left alone at home for most of the day.     See \"Rehab Therapy-Acute\" Patient Summary Report for complete documentation.      "

## 2019-02-02 NOTE — OR NURSING
2040: Rcvd report from April, RN and assumed care. Pt resting comfortably in the bed, pain is to;erable. No nausea, sleepy, but arouses to voice and spontaneously.  2050: Pain is still tolerable, no nausea, tolerating sips of soda, and tolerating decrease in supplemental O2.  2105: Meets criteria to transfer to floor, on hold to give report to RN on [facility].  2110: Still on hold to speak w/ RN. Dr Almendarez, anesthesiogist at bedside, pain is still tolerable, no nausea, tolerating 1 LPM of supplemental O2.  2120: Able to give report to ANNE Disla. Pt's pain is still tolerable, no nausea, sleepy, but arouses to voice, tolerating 1 LPM of supplemental O2 still. Pt indicates that she just wants to go to her room and sleep for the night.   2127: CNAs from [facility] able to come and take pt to her room.

## 2019-02-02 NOTE — PROGRESS NOTES
Report received from PACU RN. Pt transferred from PACU on Hospital bed. Pt awake and alert. Family at bedside.

## 2019-02-02 NOTE — PROGRESS NOTES
Hospital Medicine Daily Progress Note    Date of Service  2/2/2019    Chief Complaint  Ground-level fall-mechanical  Right hip pain    Hospital Course    *89-year-old female with history of hypertension, fell getting out of the car and landing on her right hip.  Radiology confirms right femoral neck fracture.  Patient taken to the OR on 2/1 for right hip hemiarthroplasty with Dr. Nolan*      Interval Problem Update  Patient looks very well  Sitting in chair  Able to reposition self-with only mild grimacing  Reviewed CT image-only noted cardiomegaly-radiology interpretation similar.  PT has not yet ambulated patient today.  Mentally she is much more clear    Consultants/Specialty  [facility]    Code Status  Full-we will need further discussion during admission    Disposition  Skilled nursing    Review of Systems  Review of Systems   Constitutional: Negative for malaise/fatigue.   HENT: Negative for sinus pain and sore throat.    Eyes: Negative for discharge and redness.   Respiratory: Negative for cough and shortness of breath.    Cardiovascular: Negative for chest pain and leg swelling.   Gastrointestinal: Negative for abdominal pain, diarrhea, nausea and vomiting.   Genitourinary: Positive for frequency and urgency. Negative for dysuria.   Musculoskeletal: Positive for joint pain (Only if she moves).   Skin: Negative for itching and rash.   Neurological: Negative for dizziness, weakness and headaches.   Psychiatric/Behavioral: The patient is not nervous/anxious.         Physical Exam  Temp:  [36.4 °C (97.5 °F)-37.1 °C (98.8 °F)] 36.7 °C (98 °F)  Pulse:  [52-58] 56  Resp:  [14-20] 18  BP: ()/(36-80) 94/57  SpO2:  [93 %-97 %] 97 %    Physical Exam   Constitutional: She is oriented to person, place, and time. She appears well-developed and well-nourished. No distress.   HENT:   Head: Normocephalic and atraumatic.   Mouth/Throat: Oropharynx is clear and moist.   Eyes: Pupils are equal, round, and  reactive to light. Conjunctivae and EOM are normal. Right eye exhibits no discharge. Left eye exhibits no discharge. No scleral icterus.   Neck: Neck supple.   Cardiovascular: Normal rate and regular rhythm.    Pulmonary/Chest: Effort normal and breath sounds normal.   Abdominal: Soft. Bowel sounds are normal. She exhibits no distension. There is no tenderness.   Musculoskeletal: She exhibits no edema.   Neurological: She is alert and oriented to person, place, and time. No cranial nerve deficit.   Skin: Skin is warm and dry. She is not diaphoretic.   Psychiatric: She has a normal mood and affect.   Nursing note and vitals reviewed.      Fluids    Intake/Output Summary (Last 24 hours) at 02/02/19 1121  Last data filed at 02/02/19 0900   Gross per 24 hour   Intake              990 ml   Output              700 ml   Net              290 ml       Laboratory  Recent Labs      01/31/19   1523  02/01/19   0508   WBC  8.9  9.7   RBC  3.83*  3.30*   HEMOGLOBIN  12.4  10.8*   HEMATOCRIT  35.5*  31.1*   MCV  92.7  94.2   MCH  32.4  32.7   MCHC  34.9  34.7   RDW  40.5  40.9   PLATELETCT  199  173   MPV  9.1  9.2     Recent Labs      01/31/19   1523  02/01/19   0508   SODIUM  135  135   POTASSIUM  4.0  3.9   CHLORIDE  104  107   CO2  20  18*   GLUCOSE  100*  121*   BUN  33*  26*   CREATININE  1.40  1.34   CALCIUM  9.3  8.4     Recent Labs      01/31/19   1523  02/01/19   0508   INR  1.02  1.13               Imaging  DX-HIP-UNILATERAL-WITHOUT PELVIS-1 VIEW RIGHT   Final Result         1.  No radiographic evidence of acute traumatic injury.      DX-ANKLE 3+ VIEWS LEFT   Final Result      No radiographic evidence of acute traumatic injury.      DX-CHEST-LIMITED (1 VIEW)   Final Result      1.  Enlarged cardiac silhouette with probable vascular congestion.   2.  Right paratracheal fullness may be due to vascular prominence.      DX-FEMUR-2+ RIGHT   Final Result      Right femoral neck fracture with displacement      DX-PELVIS-1 OR 2  VIEWS   Final Result      Displaced right femoral neck fracture      CT-CHEST (THORAX) W/O    (Results Pending)        Assessment/Plan  Abnormal CXR   Assessment & Plan    CT done  Images reviewed  Cardiomegaly noted, await Radiologic interpretation     Closed displaced fracture of right femoral neck (HCC)   Assessment & Plan    S/p R hip karina 2/1 [place]  Doing well  PT/OT  Pending SNF  AM labs ordered     CKD (chronic kidney disease) stage 3, GFR 30-59 ml/min ([facility])- (present on admission)   Assessment & Plan    At baseline  AM labs ordered     Hypothyroidism- (present on admission)   Assessment & Plan    TSH checked in September (low range)  Continue levothyroxine     Essential hypertension- (present on admission)   Assessment & Plan    Low this AM- no sx  Avapro held          VTE prophylaxis: Lovenox

## 2019-02-02 NOTE — OR NURSING
"1931 To PACU from OR via bed,side rails up x 3 for safety, lungs clear bilaterally, scds on patient and machine operational, pt opens eyes to voice but does not respond verbally, breathing easy and unlabored. Pt does not follow directions at this time. +2 R pedal pulse with pink/warm toes and <3 sec cap refill. CDI R hip dressing with ice pack placed over it.   1945 Pt responds appropriately to RN; pt reports tolerable pain to R hip. Pt able to dorsiflex R foot on command. Denies nausea. Pt frequently clearing throat; RN explained soreness from breathing tube and pt states verbal understanding.   2000 Pt reports \"a lot of pain\" but unable to use pain scale to quantify pain. Pt agrees that she would like pain medication. Plan to give IV Fentanyl for comfort. Tolerating sips of water.   2015 Xray completed and patient reports comfort from 2nd dose of Fentanyl for pain. Pt reports R hip pain tolerable. Denies nausea. Breathing easy and unlabored.   2025 Pt reports pain as 6/10 to R hip. Denies nausea. Clearing throat rarely. Tolerating sips of water.   2030 Pain rated as 1/10 and tolerable. Report to ANNE Waddell.       "

## 2019-02-02 NOTE — CARE PLAN
Problem: Venous Thromboembolism (VTW)/Deep Vein Thrombosis (DVT) Prevention:  Goal: Patient will participate in Venous Thrombosis (VTE)/Deep Vein Thrombosis (DVT)Prevention Measures  Outcome: PROGRESSING AS EXPECTED   02/01/19 2200   Mechanical/VTE Prophylaxis   Mechanical Prophylaxis  SCDs, Sequential Compression Device   SCDs, Sequential Compression Device On   OTHER   Risk Assessment Score 2   VTE RISK Moderate   Pharmacologic Prophylaxis Used LMWH: Enoxaparin(Lovenox)       Problem: Pain Management  Goal: Pain level will decrease to patient's comfort goal  Outcome: PROGRESSING AS EXPECTED   02/02/19 0352   OTHER   Non Verbal Scale  Calm;Sleeping;Unlabored Breathing   Pt educated to call for pain medication when needed.

## 2019-02-02 NOTE — PROGRESS NOTES
"Patient seen and examined  Reports some pain in the R hip, but tolerable.     Blood pressure (!) 94/57, pulse (!) 56, temperature 36.7 °C (98 °F), temperature source Oral, resp. rate 18, height 1.626 m (5' 4\"), weight 75.9 kg (167 lb 5.3 oz), SpO2 97 %, not currently breastfeeding.    Recent Labs      01/31/19   1523  02/01/19   0508   WBC  8.9  9.7   RBC  3.83*  3.30*   HEMOGLOBIN  12.4  10.8*   HEMATOCRIT  35.5*  31.1*   MCV  92.7  94.2   MCH  32.4  32.7   MCHC  34.9  34.7   RDW  40.5  40.9   PLATELETCT  199  173   MPV  9.1  9.2       No acute distress  Dressing clean dry and intact  Neurovascularly intact    POD#1 R hip hemiarthroplasty    Plan:  WBAT  PT/OT  Case Coordination          "

## 2019-02-02 NOTE — CARE PLAN
Problem: Knowledge Deficit  Goal: Knowledge of disease process/condition, treatment plan, diagnostic tests, and medications will improve    Intervention: Explain information regarding disease process/condition, treatment plan, diagnostic tests, and medications and document in education  Ready for OR  Son and pt request SNF upon discharge

## 2019-02-02 NOTE — OP REPORT
DATE OF SERVICE:  02/01/2019    PREOPERATIVE DIAGNOSIS:  Right femoral neck fracture.    POSTOPERATIVE DIAGNOSIS:  Right femoral neck fracture.    PROCEDURE:  Right hip hemiarthroplasty.    SURGEON:  Huang Nolan MD    ASSISTANT:  Bladimir Nathan PA-C    ESTIMATED BLOOD LOSS:  50 mL.    INDICATIONS:  This is a 89-year-old female status post a fall during which she   sustained displaced right femoral neck fracture.  Risks and benefits of   hemiarthroplasty were discussed at length, which include, but not limited to   bleeding, infection, neurovascular damage, pain, stiffness, leg length   discrepancy, instability, DVT, PE, MI, stroke and death.  They understand all   these risks and wished to proceed.    DESCRIPTION OF PROCEDURE:  Patient was sedated with LMA anesthesia and   administered preoperative antibiotics.  She was placed in lateral position on   beanbag with care taken to pad all bony prominences.  The right hip and lower   extremity were prepped and draped in usual sterile fashion and a standard   posterior approach to the hip was performed with care taken to avoid all   neurovascular structures.  The capsule and external rotators were taken off en   bloc and tagged with #5 suture for future repair and L-shaped capsulotomy.    The femoral head was removed without difficulty.  Femoral neck cut was made 1   fingerbreadth above the lesser trochanter.  The canal was broached to size 11   and a [employer] size 9 stem was cemented in the appropriate   version using third generation cement techniques.  Once cement had dried   completely, a 28+0 head and a 47 mm acetabular shell were inserted.  The hip   was reduced and was stable in full extension, external rotation, stable at 90   degrees of internal rotation and 90 degrees of forward flexion.  Wounds were   irrigated.  Capsule and external rotators were closed to greater trochanter   with #5 suture.  Fascia was closed with #1 Vicryl suture.   Skin was closed   with 2-0 Vicryl suture and staples.  Sterile dressings were applied.  Patient   tolerated the procedure well.    POSTOPERATIVE PLAN:  The patient to be weightbearing as tolerated, admitted   for perioperative antibiotics, DVT prophylaxis and pain control.       ____________________________________     NICOLE GREEN MD PLA / YESSICA    DD:  02/01/2019 19:21:32  DT:  02/01/2019 21:15:03    D#:  [id number]  Job#:  [id number]

## 2019-02-02 NOTE — PROGRESS NOTES
Hospital Medicine Daily Progress Note    Date of Service  2/1/2019    Chief Complaint  Ground-level fall-mechanical  Right hip pain    Hospital Course    *89-year-old female with history of hypertension, fell getting out of the car and landing on her right hip.  Radiology confirms right femoral neck fracture.  Patient taken to the OR on 2/1 for right hip hemiarthroplasty with Dr. Nolan*      Interval Problem Update  Patient states pain is okay as long as she is not moved  She is anxious about upcoming surgery  Son is at bedside  Patient seems a little confused likely from pain medication    Consultants/Specialty  [facility]    Code Status  Full-we will need further discussion during admission    Disposition  Skilled nursing    Review of Systems  Review of Systems   Constitutional: Negative for malaise/fatigue.   HENT: Negative for sinus pain and sore throat.    Eyes: Negative for discharge and redness.   Respiratory: Negative for cough and shortness of breath.    Cardiovascular: Negative for chest pain and leg swelling.   Gastrointestinal: Negative for abdominal pain, diarrhea, nausea and vomiting.   Genitourinary: Negative for dysuria.   Musculoskeletal: Positive for falls and joint pain (Right hip if moved).   Skin: Negative for itching and rash.   Neurological: Negative for dizziness, weakness and headaches.   Psychiatric/Behavioral: The patient is nervous/anxious.         Physical Exam  Temp:  [36.5 °C (97.7 °F)-36.9 °C (98.4 °F)] 36.5 °C (97.7 °F)  Pulse:  [58-63] 58  Resp:  [14-20] 20  BP: (131-163)/(41-80) 136/59  SpO2:  [94 %-97 %] 94 %    Physical Exam   Constitutional: She appears well-developed and well-nourished. No distress.   HENT:   Head: Normocephalic and atraumatic.   Mouth/Throat: Oropharynx is clear and moist.   Eyes: Pupils are equal, round, and reactive to light. Conjunctivae and EOM are normal. Right eye exhibits no discharge. Left eye exhibits no discharge. No scleral icterus.    Neck: Neck supple.   Cardiovascular: Normal rate and regular rhythm.    Pulmonary/Chest: Effort normal and breath sounds normal.   Abdominal: Soft. Bowel sounds are normal. She exhibits no distension. There is no tenderness.   Musculoskeletal: She exhibits no edema.   Right leg shorter, externally rotated   Neurological: She is alert. No cranial nerve deficit.   Seems a little flustered and confused  Overall is oriented, appropriate   Skin: Skin is warm and dry. She is not diaphoretic.   Psychiatric:   Anxious   Nursing note and vitals reviewed.      Fluids    Intake/Output Summary (Last 24 hours) at 02/01/19 2113  Last data filed at 02/01/19 1931   Gross per 24 hour   Intake              610 ml   Output              400 ml   Net              210 ml       Laboratory  Recent Labs      01/31/19   1523  02/01/19   0508   WBC  8.9  9.7   RBC  3.83*  3.30*   HEMOGLOBIN  12.4  10.8*   HEMATOCRIT  35.5*  31.1*   MCV  92.7  94.2   MCH  32.4  32.7   MCHC  34.9  34.7   RDW  40.5  40.9   PLATELETCT  199  173   MPV  9.1  9.2     Recent Labs      01/31/19   1523  02/01/19   0508   SODIUM  135  135   POTASSIUM  4.0  3.9   CHLORIDE  104  107   CO2  20  18*   GLUCOSE  100*  121*   BUN  33*  26*   CREATININE  1.40  1.34   CALCIUM  9.3  8.4     Recent Labs      01/31/19   1523  02/01/19   0508   INR  1.02  1.13               Imaging  DX-ANKLE 3+ VIEWS LEFT   Final Result      No radiographic evidence of acute traumatic injury.      DX-CHEST-LIMITED (1 VIEW)   Final Result      1.  Enlarged cardiac silhouette with probable vascular congestion.   2.  Right paratracheal fullness may be due to vascular prominence.      DX-FEMUR-2+ RIGHT   Final Result      Right femoral neck fracture with displacement      DX-PELVIS-1 OR 2 VIEWS   Final Result      Displaced right femoral neck fracture      DX-HIP-UNILATERAL-WITHOUT PELVIS-1 VIEW RIGHT    (Results Pending)   CT-CHEST (THORAX) W/O    (Results Pending)        Assessment/Plan  Closed  displaced fracture of right femoral neck (HCC)   Assessment & Plan    For ORIF today     CKD (chronic kidney disease) stage 3, GFR 30-59 ml/min (HCC)- (present on admission)   Assessment & Plan    At baseline  Continue to monitor     Hypothyroidism- (present on admission)   Assessment & Plan    TSH checked in September  Continue levothyroxine     Essential hypertension- (present on admission)   Assessment & Plan    Somewhat above goal likely secondary to pain  Continue Avapro          VTE prophylaxis: per ortho in AM

## 2019-02-02 NOTE — THERAPY
"Physical Therapy Evaluation completed.   Bed Mobility:     Transfers: Sit to Stand: Contact Guard Assist  Gait: Level Of Assist: Stand by Assist with Front-Wheel Walker    X 75 feet   Plan of Care: Will benefit from Physical Therapy 7 times per week  Discharge Recommendations: Equipment: Front-Wheel Walker. Post-acute therapy Discharge to a transitional care facility for continued skilled therapy services.  89 year old female S/P R karina post following fall.Pt lives at home with adult children and she is usually active.Acute PT needed for bed mob transfers and ambulation  See \"Rehab Therapy-Acute\" Patient Summary Report for complete documentation.     "

## 2019-02-03 PROBLEM — D62 ANEMIA DUE TO BLOOD LOSS, ACUTE: Status: ACTIVE | Noted: 2019-02-03

## 2019-02-03 LAB
ANION GAP SERPL CALC-SCNC: 9 MMOL/L (ref 0–11.9)
BASOPHILS # BLD AUTO: 0.5 % (ref 0–1.8)
BASOPHILS # BLD: 0.06 K/UL (ref 0–0.12)
BUN SERPL-MCNC: 29 MG/DL (ref 8–22)
CALCIUM SERPL-MCNC: 8.1 MG/DL (ref 8.4–10.2)
CHLORIDE SERPL-SCNC: 104 MMOL/L (ref 96–112)
CO2 SERPL-SCNC: 19 MMOL/L (ref 20–33)
CREAT SERPL-MCNC: 1.41 MG/DL (ref 0.5–1.4)
EOSINOPHIL # BLD AUTO: 0.47 K/UL (ref 0–0.51)
EOSINOPHIL NFR BLD: 4.3 % (ref 0–6.9)
ERYTHROCYTE [DISTWIDTH] IN BLOOD BY AUTOMATED COUNT: 42.2 FL (ref 35.9–50)
GLUCOSE SERPL-MCNC: 107 MG/DL (ref 65–99)
HCT VFR BLD AUTO: 26.2 % (ref 37–47)
HGB BLD-MCNC: 9.1 G/DL (ref 12–16)
IMM GRANULOCYTES # BLD AUTO: 0.1 K/UL (ref 0–0.11)
IMM GRANULOCYTES NFR BLD AUTO: 0.9 % (ref 0–0.9)
LYMPHOCYTES # BLD AUTO: 2.44 K/UL (ref 1–4.8)
LYMPHOCYTES NFR BLD: 22.1 % (ref 22–41)
MCH RBC QN AUTO: 32.9 PG (ref 27–33)
MCHC RBC AUTO-ENTMCNC: 34.7 G/DL (ref 33.6–35)
MCV RBC AUTO: 94.6 FL (ref 81.4–97.8)
MONOCYTES # BLD AUTO: 1.29 K/UL (ref 0–0.85)
MONOCYTES NFR BLD AUTO: 11.7 % (ref 0–13.4)
NEUTROPHILS # BLD AUTO: 6.67 K/UL (ref 2–7.15)
NEUTROPHILS NFR BLD: 60.5 % (ref 44–72)
NRBC # BLD AUTO: 0 K/UL
NRBC BLD-RTO: 0 /100 WBC
PLATELET # BLD AUTO: 140 K/UL (ref 164–446)
PMV BLD AUTO: 9.4 FL (ref 9–12.9)
POTASSIUM SERPL-SCNC: 4 MMOL/L (ref 3.6–5.5)
RBC # BLD AUTO: 2.77 M/UL (ref 4.2–5.4)
SODIUM SERPL-SCNC: 132 MMOL/L (ref 135–145)
WBC # BLD AUTO: 11 K/UL (ref 4.8–10.8)

## 2019-02-03 PROCEDURE — 99232 SBSQ HOSP IP/OBS MODERATE 35: CPT | Performed by: INTERNAL MEDICINE

## 2019-02-03 PROCEDURE — 700102 HCHG RX REV CODE 250 W/ 637 OVERRIDE(OP): Performed by: HOSPITALIST

## 2019-02-03 PROCEDURE — A9270 NON-COVERED ITEM OR SERVICE: HCPCS | Performed by: ORTHOPAEDIC SURGERY

## 2019-02-03 PROCEDURE — 97116 GAIT TRAINING THERAPY: CPT

## 2019-02-03 PROCEDURE — 85025 COMPLETE CBC W/AUTO DIFF WBC: CPT

## 2019-02-03 PROCEDURE — 80048 BASIC METABOLIC PNL TOTAL CA: CPT

## 2019-02-03 PROCEDURE — 36415 COLL VENOUS BLD VENIPUNCTURE: CPT

## 2019-02-03 PROCEDURE — A9270 NON-COVERED ITEM OR SERVICE: HCPCS | Performed by: HOSPITALIST

## 2019-02-03 PROCEDURE — 97530 THERAPEUTIC ACTIVITIES: CPT

## 2019-02-03 PROCEDURE — 700112 HCHG RX REV CODE 229: Performed by: ORTHOPAEDIC SURGERY

## 2019-02-03 PROCEDURE — 770006 HCHG ROOM/CARE - MED/SURG/GYN SEMI*

## 2019-02-03 PROCEDURE — 700102 HCHG RX REV CODE 250 W/ 637 OVERRIDE(OP): Performed by: ORTHOPAEDIC SURGERY

## 2019-02-03 PROCEDURE — 700111 HCHG RX REV CODE 636 W/ 250 OVERRIDE (IP): Performed by: ORTHOPAEDIC SURGERY

## 2019-02-03 RX ADMIN — ACETAMINOPHEN 650 MG: 325 TABLET, FILM COATED ORAL at 05:47

## 2019-02-03 RX ADMIN — DOCUSATE SODIUM 100 MG: 100 CAPSULE, LIQUID FILLED ORAL at 17:30

## 2019-02-03 RX ADMIN — OXYCODONE HYDROCHLORIDE 5 MG: 5 TABLET ORAL at 16:16

## 2019-02-03 RX ADMIN — ACETAMINOPHEN 650 MG: 325 TABLET, FILM COATED ORAL at 11:59

## 2019-02-03 RX ADMIN — ACETAMINOPHEN 650 MG: 325 TABLET, FILM COATED ORAL at 00:16

## 2019-02-03 RX ADMIN — STANDARDIZED SENNA CONCENTRATE AND DOCUSATE SODIUM 1 TABLET: 8.6; 5 TABLET, FILM COATED ORAL at 20:20

## 2019-02-03 RX ADMIN — OXYCODONE HYDROCHLORIDE 5 MG: 5 TABLET ORAL at 11:01

## 2019-02-03 RX ADMIN — MAGNESIUM HYDROXIDE 30 ML: 400 SUSPENSION ORAL at 13:08

## 2019-02-03 RX ADMIN — ENOXAPARIN SODIUM 40 MG: 100 INJECTION SUBCUTANEOUS at 06:45

## 2019-02-03 RX ADMIN — DOCUSATE SODIUM 100 MG: 100 CAPSULE, LIQUID FILLED ORAL at 05:47

## 2019-02-03 RX ADMIN — OXYCODONE HYDROCHLORIDE 5 MG: 5 TABLET ORAL at 00:25

## 2019-02-03 RX ADMIN — POLYETHYLENE GLYCOL 3350 1 PACKET: 17 POWDER, FOR SOLUTION ORAL at 13:08

## 2019-02-03 RX ADMIN — LEVOTHYROXINE SODIUM 75 MCG: 75 TABLET ORAL at 05:46

## 2019-02-03 RX ADMIN — ACETAMINOPHEN 650 MG: 325 TABLET, FILM COATED ORAL at 17:30

## 2019-02-03 ASSESSMENT — ENCOUNTER SYMPTOMS
ABDOMINAL PAIN: 0
NAUSEA: 0
EYE DISCHARGE: 0
SORE THROAT: 0
SHORTNESS OF BREATH: 0
DIARRHEA: 0
ROS GI COMMENTS: EARLY SATIETY
EYE REDNESS: 0
COUGH: 0
DIZZINESS: 0
CONSTIPATION: 1
VOMITING: 0
WEAKNESS: 0
HEADACHES: 0
SINUS PAIN: 0
NERVOUS/ANXIOUS: 0

## 2019-02-03 ASSESSMENT — GAIT ASSESSMENTS
DISTANCE (FEET): 75
GAIT LEVEL OF ASSIST: STAND BY ASSIST
DEVIATION: ANTALGIC;STEP TO
ASSISTIVE DEVICE: FRONT WHEEL WALKER

## 2019-02-03 NOTE — DISCHARGE PLANNING
Received Choice form at 1822  Agency/Facility Name: [facility]  Referral sent per Choice form @ 1822

## 2019-02-03 NOTE — CARE PLAN
Problem: Discharge Barriers/Planning  Goal: Patient's continuum of care needs will be met  Outcome: PROGRESSING SLOWER THAN EXPECTED  Will need SNF placement for strengthening.    Problem: Mobility  Goal: Risk for activity intolerance will decrease  Outcome: PROGRESSING SLOWER THAN EXPECTED  Moves slowly and will need further PT.

## 2019-02-03 NOTE — CARE PLAN
Problem: Knowledge Deficit  Goal: Knowledge of disease process/condition, treatment plan, diagnostic tests, and medications will improve    Intervention: Explain information regarding disease process/condition, treatment plan, diagnostic tests, and medications and document in education  drsg CDI with neurovasc status intact, up to BR with FWW and in chair for all meals ,enc and  Prompting pt with transfers and safety considerations, minimal post op pain well controlled with Tylenol plan dc to SNF.

## 2019-02-03 NOTE — THERAPY
"Physical Therapy Evaluation completed.   Bed Mobility:     Transfers: Sit to Stand: Contact Guard Assist  Gait: Level Of Assist: Stand by Assist with Front-Wheel Walker    X 75 feet   Plan of Care: Will benefit from Physical Therapy 7 times per week  Discharge Recommendations: Equipment: No Equipment Needed. Post-acute therapy Discharge to a transitional care facility for continued skilled therapy services.  Improved transfers and ambulation today Pt is following post hip precautions  See \"Rehab Therapy-Acute\" Patient Summary Report for complete documentation.     "

## 2019-02-03 NOTE — PROGRESS NOTES
S:  s/p Right Hip Hemiarthroplasty POD #2  Pain controlled  No N/V  No Chest Pain/SOB  Afebrile  Ambulating with walker.  Working with PT.      AVSS    Exam:    NAD A& O x3    RLE: +DF/PF/EHL SILT L4/L5/S1, Good DP PUlse.  Soft Calf Compartments without signs of DVT.  Right Hip Dressing without soakage.  Soft Thigh Compartments.  LLE:  +DF/PF/EHL SILT L4/L5/S1, Good DP PUlse.  Soft Calf Compartments without signs of DVT.    Impression    1) S/P Right Hip Hemiarthroplasty POD #2    Plan:  Continue standard plan of care  Weight bearing: WBAT with walker as per hospitalist  PT/OT  DVT prophylaxis: SCD/Teds + Lovenox  Home vs SNF as per hospitalist when stable.

## 2019-02-03 NOTE — DISCHARGE PLANNING
Anticipated Discharge Disposition: SNF    Action: SW ,met with pt at bedside regarding SNF choice.  Pt chose [facility].  SW faxed choice to HARRY Zuniga.    Barriers to Discharge: SNF acceptance    Plan: F/U on referrals.

## 2019-02-03 NOTE — PROGRESS NOTES
Hospital Medicine Daily Progress Note    Date of Service  2/3/2019    Chief Complaint  Ground-level fall-mechanical  Right hip pain    Hospital Course    *89-year-old female with history of hypertension, fell getting out of the car and landing on her right hip.  Radiology confirms right femoral neck fracture.  Patient taken to the OR on 2/1 for right hip hemiarthroplasty with Dr. Nolan*      Interval Problem Update  Patient continues to look very well  Discussed laboratory studies with patient, son  Discussed plan of care  Discussed bowel protocol with RN is patient has not had a bowel movement in 3-4 days    Consultants/Specialty  [facility]    Code Status  Full-we will need further discussion during admission    Disposition  Skilled nursing    Review of Systems  Review of Systems   Constitutional: Negative for malaise/fatigue.   HENT: Negative for sinus pain and sore throat.    Eyes: Negative for discharge and redness.   Respiratory: Negative for cough and shortness of breath.    Cardiovascular: Negative for chest pain and leg swelling.   Gastrointestinal: Positive for constipation. Negative for abdominal pain, diarrhea, nausea and vomiting.        Early satiety   Musculoskeletal: Positive for joint pain (With movement and activity).   Neurological: Negative for dizziness, weakness and headaches.   Psychiatric/Behavioral: The patient is not nervous/anxious.         Physical Exam  Temp:  [36.6 °C (97.8 °F)-36.9 °C (98.4 °F)] 36.7 °C (98.1 °F)  Pulse:  [59-69] 63  Resp:  [17-18] 18  BP: (111-133)/(43-54) 118/44  SpO2:  [94 %-97 %] 96 %    Physical Exam   Constitutional: She is oriented to person, place, and time. She appears well-developed and well-nourished. No distress.   HENT:   Head: Normocephalic and atraumatic.   Mouth/Throat: Oropharynx is clear and moist.   Eyes: Pupils are equal, round, and reactive to light. Conjunctivae and EOM are normal. Right eye exhibits no discharge. Left eye exhibits no  discharge. No scleral icterus.   Neck: Neck supple.   Cardiovascular: Normal rate and regular rhythm.    Pulmonary/Chest: Effort normal and breath sounds normal.   Abdominal: Soft. Bowel sounds are normal. She exhibits no distension. There is no tenderness.   Mild fullness without overt distention   Musculoskeletal: She exhibits no edema.   Neurological: She is alert and oriented to person, place, and time. No cranial nerve deficit.   Skin: Skin is warm and dry. She is not diaphoretic. No pallor.   Psychiatric: She has a normal mood and affect.   Nursing note and vitals reviewed.      Fluids    Intake/Output Summary (Last 24 hours) at 02/03/19 1541  Last data filed at 02/03/19 1100   Gross per 24 hour   Intake              680 ml   Output             1100 ml   Net             -420 ml       Laboratory  Recent Labs      02/01/19   0508  02/03/19   0509   WBC  9.7  11.0*   RBC  3.30*  2.77*   HEMOGLOBIN  10.8*  9.1*   HEMATOCRIT  31.1*  26.2*   MCV  94.2  94.6   MCH  32.7  32.9   MCHC  34.7  34.7   RDW  40.9  42.2   PLATELETCT  173  140*   MPV  9.2  9.4     Recent Labs      02/01/19   0508  02/03/19   0509   SODIUM  135  132*   POTASSIUM  3.9  4.0   CHLORIDE  107  104   CO2  18*  19*   GLUCOSE  121*  107*   BUN  26*  29*   CREATININE  1.34  1.41*   CALCIUM  8.4  8.1*     Recent Labs      02/01/19   0508   INR  1.13               Imaging  CT-CHEST (THORAX) W/O   Final Result      1.  Previously described prominent mediastinal contours are related to vascular structures and mild cardiomegaly.      2.  Atherosclerosis.            DX-HIP-UNILATERAL-WITHOUT PELVIS-1 VIEW RIGHT   Final Result         1.  No radiographic evidence of acute traumatic injury.      DX-ANKLE 3+ VIEWS LEFT   Final Result      No radiographic evidence of acute traumatic injury.      DX-CHEST-LIMITED (1 VIEW)   Final Result      1.  Enlarged cardiac silhouette with probable vascular congestion.   2.  Right paratracheal fullness may be due to vascular  prominence.      DX-FEMUR-2+ RIGHT   Final Result      Right femoral neck fracture with displacement      DX-PELVIS-1 OR 2 VIEWS   Final Result      Displaced right femoral neck fracture           Assessment/Plan  Anemia due to blood loss, acute   Assessment & Plan    Approximately 2 g drop  No symptoms  She is not pale  CBC in morning     Abnormal CXR   Assessment & Plan    CT done  Images reviewed  Cardiomegaly noted, radiology w/o significant findings otherwise     Closed displaced fracture of right femoral neck (HCC)   Assessment & Plan    S/p R hip karina 2/1 [place]  Doing well  PT/OT  Pending SNF       CKD (chronic kidney disease) stage 3, GFR 30-59 ml/min ([facility])- (present on admission)   Assessment & Plan    At baseline       Hypothyroidism- (present on admission)   Assessment & Plan    TSH checked in September (low range)  Continue levothyroxine     Essential hypertension- (present on admission)   Assessment & Plan    Now normotensive but low normal  Continue to hold Avapro          VTE prophylaxis: Lovenox

## 2019-02-03 NOTE — PROGRESS NOTES
Report received from Sue RODRÍGUEZ. Pt awake, alert, anxious but very pleasant and cooperative. Pt resting in bed at this time. Reports pain is well controlled currently. VSS. Dressing c, d, I. Pt denies needs. Will continue to monitor.

## 2019-02-04 PROBLEM — Z71.89 ADVANCED CARE PLANNING/COUNSELING DISCUSSION: Status: ACTIVE | Noted: 2019-02-04

## 2019-02-04 LAB
ERYTHROCYTE [DISTWIDTH] IN BLOOD BY AUTOMATED COUNT: 42.3 FL (ref 35.9–50)
HCT VFR BLD AUTO: 26 % (ref 37–47)
HGB BLD-MCNC: 9 G/DL (ref 12–16)
MCH RBC QN AUTO: 33 PG (ref 27–33)
MCHC RBC AUTO-ENTMCNC: 34.6 G/DL (ref 33.6–35)
MCV RBC AUTO: 95.2 FL (ref 81.4–97.8)
PLATELET # BLD AUTO: 142 K/UL (ref 164–446)
PMV BLD AUTO: 9.1 FL (ref 9–12.9)
RBC # BLD AUTO: 2.73 M/UL (ref 4.2–5.4)
WBC # BLD AUTO: 10.2 K/UL (ref 4.8–10.8)

## 2019-02-04 PROCEDURE — 700102 HCHG RX REV CODE 250 W/ 637 OVERRIDE(OP): Performed by: ORTHOPAEDIC SURGERY

## 2019-02-04 PROCEDURE — 700111 HCHG RX REV CODE 636 W/ 250 OVERRIDE (IP): Performed by: INTERNAL MEDICINE

## 2019-02-04 PROCEDURE — 770006 HCHG ROOM/CARE - MED/SURG/GYN SEMI*

## 2019-02-04 PROCEDURE — A9270 NON-COVERED ITEM OR SERVICE: HCPCS | Performed by: ORTHOPAEDIC SURGERY

## 2019-02-04 PROCEDURE — 99239 HOSP IP/OBS DSCHRG MGMT >30: CPT | Performed by: INTERNAL MEDICINE

## 2019-02-04 PROCEDURE — 85027 COMPLETE CBC AUTOMATED: CPT

## 2019-02-04 PROCEDURE — 700102 HCHG RX REV CODE 250 W/ 637 OVERRIDE(OP): Performed by: HOSPITALIST

## 2019-02-04 PROCEDURE — A9270 NON-COVERED ITEM OR SERVICE: HCPCS | Performed by: HOSPITALIST

## 2019-02-04 PROCEDURE — 700112 HCHG RX REV CODE 229: Performed by: ORTHOPAEDIC SURGERY

## 2019-02-04 PROCEDURE — 36415 COLL VENOUS BLD VENIPUNCTURE: CPT

## 2019-02-04 RX ORDER — AMOXICILLIN 250 MG
2 CAPSULE ORAL DAILY
Qty: 30 TAB | Refills: 0 | Status: SHIPPED | OUTPATIENT
Start: 2019-02-04 | End: 2019-06-05

## 2019-02-04 RX ORDER — BISACODYL 10 MG
10 SUPPOSITORY, RECTAL RECTAL PRN
Start: 2019-02-04 | End: 2019-03-12

## 2019-02-04 RX ORDER — OXYCODONE HYDROCHLORIDE 5 MG/1
5 TABLET ORAL EVERY 6 HOURS PRN
Qty: 12 TAB | Refills: 0 | Status: SHIPPED | OUTPATIENT
Start: 2019-02-04 | End: 2019-02-07

## 2019-02-04 RX ORDER — FERROUS SULFATE 325(65) MG
325 TABLET ORAL DAILY
Qty: 30 TAB
Start: 2019-02-04 | End: 2019-06-27

## 2019-02-04 RX ORDER — ACETAMINOPHEN 325 MG/1
650 TABLET ORAL EVERY 6 HOURS PRN
Qty: 30 TAB | Refills: 0 | Status: SHIPPED | OUTPATIENT
Start: 2019-02-04 | End: 2019-03-12

## 2019-02-04 RX ORDER — TRAMADOL HYDROCHLORIDE 50 MG/1
50 TABLET ORAL EVERY 6 HOURS PRN
Qty: 12 TAB | Refills: 0 | Status: SHIPPED | OUTPATIENT
Start: 2019-02-04 | End: 2019-02-07

## 2019-02-04 RX ORDER — POLYETHYLENE GLYCOL 3350 17 G/17G
17 POWDER, FOR SOLUTION ORAL DAILY
Start: 2019-02-04 | End: 2019-03-12

## 2019-02-04 RX ADMIN — IRBESARTAN 150 MG: 150 TABLET ORAL at 06:40

## 2019-02-04 RX ADMIN — ENOXAPARIN SODIUM 30 MG: 100 INJECTION SUBCUTANEOUS at 07:19

## 2019-02-04 RX ADMIN — ACETAMINOPHEN 650 MG: 325 TABLET, FILM COATED ORAL at 00:24

## 2019-02-04 RX ADMIN — OXYCODONE HYDROCHLORIDE 5 MG: 5 TABLET ORAL at 20:18

## 2019-02-04 RX ADMIN — DOCUSATE SODIUM 100 MG: 100 CAPSULE, LIQUID FILLED ORAL at 18:04

## 2019-02-04 RX ADMIN — ACETAMINOPHEN 650 MG: 325 TABLET, FILM COATED ORAL at 18:04

## 2019-02-04 RX ADMIN — ACETAMINOPHEN 650 MG: 325 TABLET, FILM COATED ORAL at 06:40

## 2019-02-04 RX ADMIN — DOCUSATE SODIUM 100 MG: 100 CAPSULE, LIQUID FILLED ORAL at 06:40

## 2019-02-04 RX ADMIN — OXYCODONE HYDROCHLORIDE 5 MG: 5 TABLET ORAL at 06:45

## 2019-02-04 RX ADMIN — BISACODYL 10 MG: 10 SUPPOSITORY RECTAL at 07:18

## 2019-02-04 RX ADMIN — ACETAMINOPHEN 650 MG: 325 TABLET, FILM COATED ORAL at 12:40

## 2019-02-04 RX ADMIN — STANDARDIZED SENNA CONCENTRATE AND DOCUSATE SODIUM 1 TABLET: 8.6; 5 TABLET, FILM COATED ORAL at 20:18

## 2019-02-04 RX ADMIN — LEVOTHYROXINE SODIUM 75 MCG: 75 TABLET ORAL at 06:40

## 2019-02-04 NOTE — CARE PLAN
Problem: Bowel/Gastric:  Goal: Normal bowel function is maintained or improved  Outcome: PROGRESSING SLOWER THAN EXPECTED  Continued need to have bowel movement      Problem: Pain Management  Goal: Pain level will decrease to patient's comfort goal  Outcome: PROGRESSING AS EXPECTED  Pain managed with oral pain medication options

## 2019-02-04 NOTE — CARE PLAN
Problem: Bowel/Gastric:  Goal: Will not experience complications related to bowel motility  Suppository given this AM. Will closely monitor.     Problem: Knowledge Deficit  Goal: Knowledge of the prescribed therapeutic regimen will improve  POC discussed. Pt understands the plan is to DC to SNF once pt has BM. All questions and concerns addressed.

## 2019-02-04 NOTE — PROGRESS NOTES
Pt is AAO x4. Confused at times.  Denies pain or discomfort at this time.  VS WNL.  R hip dressing in place, with old scant drainage.  R PIV patent, saline locked.  Pt up for meal.   POC discussed.  All needs met at this time.  Bed in low position.  Call light within reach.  Rounding in place.

## 2019-02-04 NOTE — DISCHARGE SUMMARY
Discharge Summary    CHIEF COMPLAINT ON ADMISSION  Chief Complaint   Patient presents with   • T-5000 GLF     Fell getting out of car, landing on R hip.  +external rotation and shortening. Pain R lateral hip.       Reason for Admission  poss hip fx     CODE STATUS  Full Code    HPI & HOSPITAL COURSE    *89-year-old female with history of hypertension, fell getting out of the car and landing on her right hip.  Radiology confirms right femoral neck fracture.  Patient taken to the OR on 2/1 for right hip hemiarthroplasty with Dr. Nolan*      Patient was constipated since prior to admission, on the date of discharge she was found to be impacted and was disimpacted.  She should be continued on aggressive bowel protocol.  She has done very well with physical therapy, has used her oxycodone infrequently.  Will also prescribe tramadol at discharge to allow a somewhat lesser dose of opiates as she progresses.    Patient does have an unusual tic where she will breathe very quickly for several seconds-her son says this is chronic.    I did sit down and discuss advance directives with her prior to discharge-we completed a POLST together.  At this time she wishes full resuscitation efforts, but does not wish long-term feeding tubes.  She understands that mechanical ventilation is needed after CPR and finds this acceptable presently.    She developed acute blood loss anemia.  Hemoglobin on presentation was 12.4 preop she was 10.8, 2/3 she was 9.1-today she is 9.0.  This has stabilized.  We will start her on iron supplementation.    Therefore, she is discharged in good and stable condition to skilled nursing facility.    The patient met 2-midnight criteria for an inpatient stay at the time of discharge.      FOLLOW UP ITEMS POST DISCHARGE  Orthopedics  Primary care    DISCHARGE DIAGNOSES  Active Problems:    Essential hypertension POA: Yes    Hypothyroidism POA: Yes      Overview: ICD-10 transition    CKD (chronic kidney  disease) stage 3, GFR 30-59 ml/min ([facility]) POA: Yes    Closed displaced fracture of right femoral neck ([facility]) POA: Unknown    Abnormal CXR POA: Unknown    Anemia due to blood loss, acute POA: Unknown    Advanced care planning/counseling discussion POA: Unknown  Resolved Problems:    * No resolved hospital problems. *      FOLLOW UP  Future Appointments  Date Time Provider Department Center   7/2/2019 9:20 AM ROGER Rosales     No follow-up provider specified.    MEDICATIONS ON DISCHARGE     Medication List      START taking these medications      Instructions   acetaminophen 325 MG Tabs  Commonly known as:  TYLENOL   Take 2 Tabs by mouth every 6 hours as needed (Mild Pain; (Pain scale 1-3); Temp greater than 100.5 F).  Dose:  650 mg     bisacodyl 10 MG Supp  Commonly known as:  DULCOLAX   Insert 1 Suppository in rectum as needed (if sennosides, docusate, polyethylene glycol 3350, and/or magnesium hydroxide ineffective or not ordered).  Dose:  10 mg     enoxaparin 30 MG/0.3ML Soln inj  Start taking on:  2/5/2019  Commonly known as:  LOVENOX   Inject 0.3 mL as instructed every day.  Dose:  30 mg     ferrous sulfate 325 (65 Fe) MG tablet   Take 1 Tab by mouth every day.  Dose:  325 mg     magnesium hydroxide 400 MG/5ML Susp  Commonly known as:  MILK OF MAGNESIA   Take 30 mL by mouth 3 times a day as needed (if sennosides, docusate, and/or polyethylene glycol 3350 ineffective or not ordered).  Dose:  30 mL     oxyCODONE immediate-release 5 MG Tabs  Commonly known as:  ROXICODONE   Take 1 Tab by mouth every 6 hours as needed for Severe Pain for up to 3 days.  Dose:  5 mg     polyethylene glycol/lytes Pack  Commonly known as:  MIRALAX   Take 1 Packet by mouth every day. Hold for diarrhea  Dose:  17 g     senna-docusate 8.6-50 MG Tabs  Commonly known as:  PERICOLACE or SENOKOT S   Take 2 Tabs by mouth every day.  Dose:  2 Tab     tramadol 50 MG Tabs  Commonly known as:  ULTRAM   Take 1 Tab by mouth  every 6 hours as needed for Moderate Pain for up to 3 days.  Dose:  50 mg        CONTINUE taking these medications      Instructions   irbesartan 150 MG Tabs  Commonly known as:  AVAPRO   Take 1 Tab by mouth every day.  Dose:  150 mg     SYNTHROID 75 MCG Tabs  Generic drug:  levothyroxine   Take 1 Tab by mouth Every morning on an empty stomach.  Dose:  75 mcg            Allergies  No Known Allergies    DIET  Orders Placed This Encounter   Procedures   • Diet Order Regular     Standing Status:   Standing     Number of Occurrences:   1     Order Specific Question:   Diet:     Answer:   Regular [1]       ACTIVITY  Weightbearing as tolerated    LINES, DRAINS, AND WOUNDS  This is an automated list. Peripheral IVs will be removed prior to discharge.  Peripheral IV 01/31/19 20 G Right Forearm (Active)   Site Assessment Clean;Dry;Intact 2/4/2019  8:00 AM   Dressing Type Transparent 2/4/2019  8:00 AM   Line Status Saline locked 2/4/2019  8:00 AM   Dressing Status Clean;Dry;Intact 2/4/2019  8:00 AM   Dressing Intervention N/A 2/4/2019  8:00 AM   Date Primary Tubing Changed 02/02/19 2/4/2019  8:00 AM   Date Secondary Tubing Changed 02/02/19 2/4/2019  8:00 AM   NEXT Primary Tubing Change  02/05/19 2/4/2019  8:00 AM   NEXT Secondary Tubing Change  02/04/19 2/4/2019  8:00 AM   Infiltration Grading ([facility]) 0 2/4/2019  8:00 AM   Phlebitis Scale ([facility] Only) 0 2/4/2019  8:00 AM          Peripheral IV 01/31/19 20 G Right Forearm (Active)   Site Assessment Clean;Dry;Intact 2/4/2019  8:00 AM   Dressing Type Transparent 2/4/2019  8:00 AM   Line Status Saline locked 2/4/2019  8:00 AM   Dressing Status Clean;Dry;Intact 2/4/2019  8:00 AM   Dressing Intervention N/A 2/4/2019  8:00 AM   Date Primary Tubing Changed 02/02/19 2/4/2019  8:00 AM   Date Secondary Tubing Changed 02/02/19 2/4/2019  8:00 AM   NEXT Primary Tubing Change  02/05/19 2/4/2019  8:00 AM   NEXT Secondary Tubing Change  02/04/19 2/4/2019  8:00 AM   Infiltration  Grading ([facility]) 0 2/4/2019  8:00 AM   Phlebitis Scale ([facility]) 0 2/4/2019  8:00 AM               MENTAL STATUS ON TRANSFER  Level of Consciousness: Alert  Orientation : Oriented x 4 (confused at times)  Speech: Speech Clear    CONSULTATIONS  Orthopedics    PROCEDURES  DATE OF SERVICE:  02/01/2019     PREOPERATIVE DIAGNOSIS:  Right femoral neck fracture.     POSTOPERATIVE DIAGNOSIS:  Right femoral neck fracture.     PROCEDURE:  Right hip hemiarthroplasty.     SURGEON:  Huang Nolan MD     ASSISTANT:  Bladimir Nathan PA-C     ESTIMATED BLOOD LOSS:  50 mL.     INDICATIONS:  This is a 89-year-old female status post a fall during which she   sustained displaced right femoral neck fracture.  Risks and benefits of   hemiarthroplasty were discussed at length, which include, but not limited to   bleeding, infection, neurovascular damage, pain, stiffness, leg length   discrepancy, instability, DVT, PE, MI, stroke and death.  They understand all   these risks and wished to proceed.     DESCRIPTION OF PROCEDURE:  Patient was sedated with LMA anesthesia and   administered preoperative antibiotics.  She was placed in lateral position on   beanbag with care taken to pad all bony prominences.  The right hip and lower   extremity were prepped and draped in usual sterile fashion and a standard   posterior approach to the hip was performed with care taken to avoid all   neurovascular structures.  The capsule and external rotators were taken off en   bloc and tagged with #5 suture for future repair and L-shaped capsulotomy.    The femoral head was removed without difficulty.  Femoral neck cut was made 1   fingerbreadth above the lesser trochanter.  The canal was broached to size 11   and a [employer] size 9 stem was cemented in the appropriate   version using third generation cement techniques.  Once cement had dried   completely, a 28+0 head and a 47 mm acetabular shell were inserted.  The hip   was  reduced and was stable in full extension, external rotation, stable at 90   degrees of internal rotation and 90 degrees of forward flexion.  Wounds were   irrigated.  Capsule and external rotators were closed to greater trochanter   with #5 suture.  Fascia was closed with #1 Vicryl suture.  Skin was closed   with 2-0 Vicryl suture and staples.  Sterile dressings were applied.  Patient   tolerated the procedure well.     POSTOPERATIVE PLAN:  The patient to be weightbearing as tolerated, admitted   for perioperative antibiotics, DVT prophylaxis and pain control.        ____________________________________     NICOLE GREEN MD       LABORATORY  Lab Results   Component Value Date    SODIUM 132 (L) 02/03/2019    POTASSIUM 4.0 02/03/2019    CHLORIDE 104 02/03/2019    CO2 19 (L) 02/03/2019    GLUCOSE 107 (H) 02/03/2019    BUN 29 (H) 02/03/2019    CREATININE 1.41 (H) 02/03/2019        Lab Results   Component Value Date    WBC 10.2 02/04/2019    HEMOGLOBIN 9.0 (L) 02/04/2019    HEMATOCRIT 26.0 (L) 02/04/2019    PLATELETCT 142 (L) 02/04/2019        Total time of the discharge process exceeds 35 minutes.  Not inclusive of time devoted to Advanced care discussion and POLST

## 2019-02-04 NOTE — ASSESSMENT & PLAN NOTE
"POLST form completed, patient wants full code \"my mother lived to 99\"  Doesn't want long term feeding tube but short term is OK  12 minutes devoted to this activity  "

## 2019-02-04 NOTE — DISCHARGE PLANNING
Agency/Facility Name: [facility]  Spoke To: Correspondence  Outcome: Patient is accepted.

## 2019-02-04 NOTE — PROGRESS NOTES
No change from morning assessment except no BM as yet, bowel protocol in progress. Tolerates activity but complains of pain, medicated per MAR.

## 2019-02-05 VITALS
HEART RATE: 53 BPM | DIASTOLIC BLOOD PRESSURE: 45 MMHG | OXYGEN SATURATION: 93 % | HEIGHT: 64 IN | WEIGHT: 167.33 LBS | TEMPERATURE: 97.8 F | SYSTOLIC BLOOD PRESSURE: 107 MMHG | RESPIRATION RATE: 18 BRPM | BODY MASS INDEX: 28.57 KG/M2

## 2019-02-05 PROCEDURE — 700111 HCHG RX REV CODE 636 W/ 250 OVERRIDE (IP): Performed by: INTERNAL MEDICINE

## 2019-02-05 PROCEDURE — A9270 NON-COVERED ITEM OR SERVICE: HCPCS | Performed by: HOSPITALIST

## 2019-02-05 PROCEDURE — A9270 NON-COVERED ITEM OR SERVICE: HCPCS | Performed by: ORTHOPAEDIC SURGERY

## 2019-02-05 PROCEDURE — 700102 HCHG RX REV CODE 250 W/ 637 OVERRIDE(OP): Performed by: HOSPITALIST

## 2019-02-05 PROCEDURE — 700112 HCHG RX REV CODE 229: Performed by: ORTHOPAEDIC SURGERY

## 2019-02-05 PROCEDURE — 700102 HCHG RX REV CODE 250 W/ 637 OVERRIDE(OP): Performed by: ORTHOPAEDIC SURGERY

## 2019-02-05 RX ADMIN — OXYCODONE HYDROCHLORIDE 5 MG: 5 TABLET ORAL at 11:49

## 2019-02-05 RX ADMIN — ACETAMINOPHEN 650 MG: 325 TABLET, FILM COATED ORAL at 14:25

## 2019-02-05 RX ADMIN — ACETAMINOPHEN 650 MG: 325 TABLET, FILM COATED ORAL at 00:16

## 2019-02-05 RX ADMIN — ENOXAPARIN SODIUM 30 MG: 100 INJECTION SUBCUTANEOUS at 09:05

## 2019-02-05 RX ADMIN — LEVOTHYROXINE SODIUM 75 MCG: 75 TABLET ORAL at 05:48

## 2019-02-05 RX ADMIN — OXYCODONE HYDROCHLORIDE 5 MG: 5 TABLET ORAL at 09:05

## 2019-02-05 RX ADMIN — OXYCODONE HYDROCHLORIDE 5 MG: 5 TABLET ORAL at 14:25

## 2019-02-05 RX ADMIN — ACETAMINOPHEN 650 MG: 325 TABLET, FILM COATED ORAL at 05:48

## 2019-02-05 RX ADMIN — DOCUSATE SODIUM 100 MG: 100 CAPSULE, LIQUID FILLED ORAL at 05:48

## 2019-02-05 NOTE — DISCHARGE PLANNING
Received Transport Form @ 2539  Spoke to Angel @ [facility]    Transport is scheduled for 2/5 @1500 going to [facility].

## 2019-02-05 NOTE — DISCHARGE INSTRUCTIONS
Discharge Instructions    Discharged to other by wheelchair van with escort. Discharged via wheelchair, hospital escort: Yes.  Special equipment needed: Not Applicable    Be sure to schedule a follow-up appointment with your primary care doctor or any specialists as instructed.     Discharge Plan:   Influenza Vaccine Indication: Not indicated: Previously immunized this influenza season and > 8 years of age    I understand that a diet low in cholesterol, fat, and sodium is recommended for good health. Unless I have been given specific instructions below for another diet, I accept this instruction as my diet prescription.   Other diet: regular    Special Instructions: None    · Is patient discharged on Warfarin / Coumadin?   No     Depression / Suicide Risk    As you are discharged from this [facility] facility, it is important to learn how to keep safe from harming yourself.    Recognize the warning signs:  · Abrupt changes in personality, positive or negative- including increase in energy   · Giving away possessions  · Change in eating patterns- significant weight changes-  positive or negative  · Change in sleeping patterns- unable to sleep or sleeping all the time   · Unwillingness or inability to communicate  · Depression  · Unusual sadness, discouragement and loneliness  · Talk of wanting to die  · Neglect of personal appearance   · Rebelliousness- reckless behavior  · Withdrawal from people/activities they love  · Confusion- inability to concentrate     If you or a loved one observes any of these behaviors or has concerns about self-harm, here's what you can do:  · Talk about it- your feelings and reasons for harming yourself  · Remove any means that you might use to hurt yourself (examples: pills, rope, extension cords, firearm)  · Get professional help from the community (Mental Health, Substance Abuse, psychological counseling)  · Do not be alone:Call your Safe Contact- someone whom you trust who will be  there for you.  · Call your local CRISIS HOTLINE 343-4260 or 409-260-0692  · Call your local Children's Mobile Crisis Response Team [place] (112) 163-7674 or www.[employer]  · Call the toll free National Suicide Prevention Hotlines   · National Suicide Prevention Lifeline 665-617-KMEA (9799)  · National [employer] Network 800-SUICIDE (055-5770)

## 2019-02-05 NOTE — CARE PLAN
Problem: Communication  Goal: The ability to communicate needs accurately and effectively will improve  Outcome: PROGRESSING SLOWER THAN EXPECTED  Frequently calls out but does not use call light    Problem: Infection  Goal: Will remain free from infection  Outcome: PROGRESSING AS EXPECTED  No s/s infection at this time    Problem: Venous Thromboembolism (VTW)/Deep Vein Thrombosis (DVT) Prevention:  Goal: Patient will participate in Venous Thrombosis (VTE)/Deep Vein Thrombosis (DVT)Prevention Measures  Outcome: PROGRESSING AS EXPECTED  SCDs on    Problem: Bowel/Gastric:  Goal: Normal bowel function is maintained or improved  Outcome: PROGRESSING AS EXPECTED  Pt had BM 2/4/19    Problem: Pain Management  Goal: Pain level will decrease to patient's comfort goal  Outcome: PROGRESSING AS EXPECTED  Pain managed with oral pain medications

## 2019-02-05 NOTE — DISCHARGE PLANNING
Anticipated Discharge Disposition: pt d/cing to [facility]    Action: [facility] reports pt is set for transport to [facility] at 3PM.    Spoke to bedside RN and pt can transfer via w/c van.     Lsw completed transfer packet and COBRA. LSW provided to RN.    Barriers to Discharge: none    Plan: d/c to [facility] today at 3PM via w/c van

## 2019-02-05 NOTE — PROGRESS NOTES
Patient a+o x 4, [place], denies sob/lightheadedness/numbness/tingling/dizziness/chest pain/n/v/d. Tolerating diet. C/o pain this am to right hip - oxycodone admin a/o - sleeping on reassessment.  Fall precautions/hourly rounding maintained, call light within reach and functioning, all items within reach.  Patient encouraged to call for assistance, poc reviewed with patient, ?'s/concerns answered.     Discharged to be discharged to [facility] at 3pm. Tried to call and give report however RN was not available. Will call  to discharge.  Patient stated she is ready to be discharged to [facility].

## 2019-03-12 ENCOUNTER — OFFICE VISIT (OUTPATIENT)
Dept: MEDICAL GROUP | Facility: MEDICAL CENTER | Age: 84
End: 2019-03-12
Payer: COMMERCIAL

## 2019-03-12 VITALS
WEIGHT: 154 LBS | HEART RATE: 71 BPM | OXYGEN SATURATION: 98 % | BODY MASS INDEX: 26.29 KG/M2 | DIASTOLIC BLOOD PRESSURE: 50 MMHG | TEMPERATURE: 98 F | HEIGHT: 64 IN | SYSTOLIC BLOOD PRESSURE: 100 MMHG

## 2019-03-12 DIAGNOSIS — E87.6 HYPOKALEMIA: ICD-10-CM

## 2019-03-12 DIAGNOSIS — Z79.899 MEDICATION COURSE CHANGED: ICD-10-CM

## 2019-03-12 DIAGNOSIS — S72.001A CLOSED RIGHT HIP FRACTURE, INITIAL ENCOUNTER (HCC): ICD-10-CM

## 2019-03-12 DIAGNOSIS — D50.8 OTHER IRON DEFICIENCY ANEMIA: ICD-10-CM

## 2019-03-12 DIAGNOSIS — I10 ESSENTIAL HYPERTENSION: ICD-10-CM

## 2019-03-12 PROCEDURE — 99214 OFFICE O/P EST MOD 30 MIN: CPT | Performed by: NURSE PRACTITIONER

## 2019-03-12 RX ORDER — POTASSIUM CHLORIDE 20 MEQ/1
20 TABLET, EXTENDED RELEASE ORAL 2 TIMES DAILY
Qty: 60 TAB | Refills: 0 | Status: SHIPPED | OUTPATIENT
Start: 2019-03-12 | End: 2019-06-27

## 2019-03-12 NOTE — PROGRESS NOTES
Subjective:     Ginger Laura is a 89 y.o. female who presents with f/u afte fx with surgical repair.    HPI:   Seen in f/u after fx hip with surgical repair.  She was dc'd from rehab recently.  had to stay 1 wk longer d/t colitis.  seh had fever with that.  Was occurring in am for 5 days.  Dec in pm.  That has resolved.   She has had a cbc TO Monitor the anemia.  Per son the SNF told him it was improving. She also last had a bmp in hosp.  We don't have lab from SNF.  Last glucose high.  Cr high.  Ca++ low, na low.  Will recheck.   She is not on any pain meds.  Not havign any pain.    Reviewed all meds with son.  He will restart all but irbesartan.  Will monitor bp.  Not restart until bp up.   They were to have HHC.  Nurses came over on sunday.  They haven't been back.  He will call for PT, HOME HEALTH AIDE and nurses.    She lives with son.  He is her caregiver.  She was independent before surgery. He has been holding her meds for last 3 days will restart except for irbesartan.  Her bp is low today.        Patient Active Problem List    Diagnosis Date Noted   • Advanced care planning/counseling discussion 02/04/2019   • Anemia due to blood loss, acute 02/03/2019   • Abnormal CXR 02/02/2019   • Closed displaced fracture of right femoral neck ([facility]) 01/31/2019   • Vertigo 06/10/2018   • Non-intractable cyclical vomiting with nausea 06/04/2018   • CKD (chronic kidney disease) stage 3, GFR 30-59 ml/min ([facility]) 01/11/2017   • Hyperlipidemia 05/14/2013   • Essential hypertension 05/14/2013   • Other malaise and fatigue 05/14/2013   • Hypothyroidism 05/14/2013   • Anemia 05/14/2013   • Arthropathy 05/14/2013   • Prediabetes 05/14/2013       Current medicines (including changes today)  Current Outpatient Prescriptions   Medication Sig Dispense Refill   • potassium chloride SA (KDUR) 20 MEQ Tab CR Take 1 Tab by mouth 2 times a day. 60 Tab 0   • senna-docusate (PERICOLACE OR SENOKOT S) 8.6-50 MG Tab Take 2 Tabs by mouth  "every day. 30 Tab 0   • ferrous sulfate 325 (65 Fe) MG tablet Take 1 Tab by mouth every day. 30 Tab    • irbesartan (AVAPRO) 150 MG Tab Take 1 Tab by mouth every day. 90 Tab 3   • SYNTHROID 75 MCG Tab Take 1 Tab by mouth Every morning on an empty stomach. 90 Tab 3     No current facility-administered medications for this visit.        No Known Allergies    ROS  Constitutional: Negative. Negative for fever, chills, weight loss, malaise/fatigue and diaphoresis.   HENT: Negative. Negative for hearing loss, ear pain, nosebleeds, congestion, sore throat, neck pain, tinnitus and ear discharge.   Respiratory: Negative. Negative for cough, hemoptysis, sputum production, shortness of breath, wheezing and stridor.   Cardiovascular: Negative. Negative for chest pain, palpitations, orthopnea, claudication, leg swelling and PND.   Gastrointestinal: Denies nausea, vomiting, constipation, heartburn, melena or hematochezia.  Genitourinary: Denies dysuria, hematuria, urinary incontinence, frequency or urgency.        Objective:     Blood pressure 100/50, pulse 71, temperature 36.7 °C (98 °F), temperature source Temporal, height 1.626 m (5' 4\"), weight 69.9 kg (154 lb), SpO2 98 %, not currently breastfeeding. Body mass index is 26.43 kg/m².    Physical Exam:  Vitals reviewed.  Constitutional: Oriented to person, place, and time. appears well-developed and well-nourished. No distress.   Cardiovascular: Normal rate, regular rhythm, normal heart sounds and intact distal pulses. Exam reveals no gallop and no friction rub. No murmur heard. No carotid bruits.   Pulmonary/Chest: Effort normal and breath sounds normal. No stridor. No respiratory distress. no wheezes or rales. exhibits no tenderness.   Musculoskeletal: amb with walker.   exhibits 1+ nani pedal edema. nani pedal pulses 2+.  Lymphadenopathy: No cervical or supraclavicular adenopathy.   Neurological: answers questions approp. exhibits normal muscle tone.  Skin: Skin is warm and " dry. No diaphoresis.   Psychiatric: Normal mood and affect. Behavior is normal.      Assessment and Plan:     The following treatment plan was discussed:    1. Closed right hip fracture, initial encounter ([facility])      son will have HHC do nurses, PT and home health aide.  f/u with pcp as planned   2. Essential hypertension  potassium chloride SA ([facility]UR) 20 MEQ Tab CR    Comp Metabolic Panel    bp is low.  will hold irbesartan until bp elevated.  HHC will monitor bp   3. Other iron deficiency anemia  CBC WITH DIFFERENTIAL    per son, SNF said this was repaired.  will recheck CBC and f/u with him    4. Hypokalemia  potassium chloride SA (KDUR) 20 MEQ Tab CR    Comp Metabolic Panel    last CMP that we have showed abn cr, ca++, na.  will rechek CMP and f/u wiht son with results.     5. Medication course changed      reviewed all meds in detail with pt son.  he will restart synthroid, senna, fe and k+.  will hold avapro until bp up.           Followup: Return if symptoms worsen or fail to improve.

## 2019-03-13 ENCOUNTER — TELEPHONE (OUTPATIENT)
Dept: MEDICAL GROUP | Facility: MEDICAL CENTER | Age: 84
End: 2019-03-13

## 2019-03-13 LAB
ALBUMIN SERPL-MCNC: 3.6 G/DL (ref 3.2–4.6)
ALBUMIN/GLOB SERPL: 1.4 {RATIO} (ref 1.2–2.2)
ALP SERPL-CCNC: 73 IU/L (ref 39–117)
ALT SERPL-CCNC: 12 IU/L (ref 0–32)
AST SERPL-CCNC: 18 IU/L (ref 0–40)
BASOPHILS # BLD AUTO: 0.1 X10E3/UL (ref 0–0.2)
BASOPHILS NFR BLD AUTO: 1 %
BILIRUB SERPL-MCNC: 0.3 MG/DL (ref 0–1.2)
BUN SERPL-MCNC: 11 MG/DL (ref 10–36)
BUN/CREAT SERPL: 11 (ref 12–28)
CALCIUM SERPL-MCNC: 8.7 MG/DL (ref 8.7–10.3)
CHLORIDE SERPL-SCNC: 103 MMOL/L (ref 96–106)
CO2 SERPL-SCNC: 20 MMOL/L (ref 20–29)
CREAT SERPL-MCNC: 0.96 MG/DL (ref 0.57–1)
EOSINOPHIL # BLD AUTO: 0.3 X10E3/UL (ref 0–0.4)
EOSINOPHIL NFR BLD AUTO: 3 %
ERYTHROCYTE [DISTWIDTH] IN BLOOD BY AUTOMATED COUNT: 13.4 % (ref 12.3–15.4)
GLOBULIN SER CALC-MCNC: 2.5 G/DL (ref 1.5–4.5)
GLUCOSE SERPL-MCNC: 102 MG/DL (ref 65–99)
HCT VFR BLD AUTO: 29.6 % (ref 34–46.6)
HGB BLD-MCNC: 9.8 G/DL (ref 11.1–15.9)
IMM GRANULOCYTES # BLD AUTO: 0.1 X10E3/UL (ref 0–0.1)
IMM GRANULOCYTES NFR BLD AUTO: 1 %
IMMATURE CELLS  115398: ABNORMAL
LYMPHOCYTES # BLD AUTO: 2.3 X10E3/UL (ref 0.7–3.1)
LYMPHOCYTES NFR BLD AUTO: 22 %
MCH RBC QN AUTO: 31 PG (ref 26.6–33)
MCHC RBC AUTO-ENTMCNC: 33.1 G/DL (ref 31.5–35.7)
MCV RBC AUTO: 94 FL (ref 79–97)
MONOCYTES # BLD AUTO: 1 X10E3/UL (ref 0.1–0.9)
MONOCYTES NFR BLD AUTO: 9 %
MORPHOLOGY BLD-IMP: ABNORMAL
NEUTROPHILS # BLD AUTO: 6.5 X10E3/UL (ref 1.4–7)
NEUTROPHILS NFR BLD AUTO: 64 %
NRBC BLD AUTO-RTO: ABNORMAL %
PLATELET # BLD AUTO: 285 X10E3/UL (ref 150–379)
POTASSIUM SERPL-SCNC: 4.5 MMOL/L (ref 3.5–5.2)
PROT SERPL-MCNC: 6.1 G/DL (ref 6–8.5)
RBC # BLD AUTO: 3.16 X10E6/UL (ref 3.77–5.28)
SODIUM SERPL-SCNC: 138 MMOL/L (ref 134–144)
WBC # BLD AUTO: 10.3 X10E3/UL (ref 3.4–10.8)

## 2019-03-13 NOTE — TELEPHONE ENCOUNTER
Please let pt and son know that the cbc shows anemia is improving but not near normal yet.  Continue fe daily.    CMP shows that renal function if improved.  Other abn have resolved.  K+ is normal so stay on same dose.

## 2019-06-05 ENCOUNTER — OFFICE VISIT (OUTPATIENT)
Dept: MEDICAL GROUP | Facility: MEDICAL CENTER | Age: 84
End: 2019-06-05
Payer: COMMERCIAL

## 2019-06-05 VITALS
HEIGHT: 63 IN | RESPIRATION RATE: 16 BRPM | HEART RATE: 82 BPM | OXYGEN SATURATION: 94 % | DIASTOLIC BLOOD PRESSURE: 78 MMHG | BODY MASS INDEX: 27.34 KG/M2 | TEMPERATURE: 98 F | WEIGHT: 154.32 LBS | SYSTOLIC BLOOD PRESSURE: 122 MMHG

## 2019-06-05 DIAGNOSIS — I10 ESSENTIAL HYPERTENSION: ICD-10-CM

## 2019-06-05 DIAGNOSIS — R53.83 FATIGUE, UNSPECIFIED TYPE: ICD-10-CM

## 2019-06-05 DIAGNOSIS — E03.9 ACQUIRED HYPOTHYROIDISM: ICD-10-CM

## 2019-06-05 DIAGNOSIS — G31.84 MILD COGNITIVE IMPAIRMENT: ICD-10-CM

## 2019-06-05 PROBLEM — R11.15 NON-INTRACTABLE CYCLICAL VOMITING WITH NAUSEA: Status: RESOLVED | Noted: 2018-06-04 | Resolved: 2019-06-05

## 2019-06-05 PROCEDURE — 99214 OFFICE O/P EST MOD 30 MIN: CPT | Performed by: FAMILY MEDICINE

## 2019-06-05 RX ORDER — LEVOTHYROXINE SODIUM 75 MCG
75 TABLET ORAL
Qty: 90 TAB | Refills: 3 | Status: SHIPPED | OUTPATIENT
Start: 2019-06-05 | End: 2019-09-13 | Stop reason: SDUPTHER

## 2019-06-05 NOTE — ASSESSMENT & PLAN NOTE
This is a chronic health problem for this patient where she is on Synthroid 75 mcg daily.  She just ran out of that medication.  I recommended that we have her continue on that same dose because her blood work in the hospital in February was excellent.  We will just renew the medication.

## 2019-06-05 NOTE — ASSESSMENT & PLAN NOTE
This is a chronic health problem for this patient for which she is no longer on irbesartan she was taken 150 mg daily but last time she was seen 3/12/2019 her blood pressure was 100/50.  Her blood pressure is now 122/78 without medication.  She is been off for 2 months.  I do not think we need to put her back on the medication.  She will continue off and we will just monitor blood pressures.

## 2019-06-05 NOTE — ASSESSMENT & PLAN NOTE
This patient is starting to exhibit signs of mild cognitive impairment.  With some prompting she knew who the president and  were but she has trouble with relating what went on this morning before she came into the office.  Her son has also noted that her cognition is not as good as it had been prior to her falling and breaking her hip in February.  We will keep an eye on this and determine whether or not any medication is needed.

## 2019-06-05 NOTE — PROGRESS NOTES
CC:Diagnoses of Fatigue, unspecified type, Essential hypertension, Acquired hypothyroidism, and Mild cognitive impairment were pertinent to this visit.    HISTORY OF PRESENT ILLNESS: Patient is a 89 y.o. female established patient who presents today to talk about chronic health problems and changes that the patient and her son have seen recently.  Patient did fall and break her hip in February and had to have hip replacement surgery.  She is done fairly well with that.  She did spend some time in rehab because of a cellulitis that developed.    Health Maintenance: Completed    Essential hypertension  This is a chronic health problem for this patient for which she is no longer on irbesartan she was taken 150 mg daily but last time she was seen 3/12/2019 her blood pressure was 100/50.  Her blood pressure is now 122/78 without medication.  She is been off for 2 months.  I do not think we need to put her back on the medication.  She will continue off and we will just monitor blood pressures.    Fatigue  This is a chronic health problem for this patient where her son is noted over the last 4-6 weeks his mother has been sleeping an excessive amount of time.  There is some days that she will sleep till 3 PM in the afternoon.  He works from home and is aware that she does not get up during that time.  He does think she is getting up in the middle of the night and possibly eating high caffeine foods or drinking Coca-Cola which may be keeping her awake in the middle of the night.  This patient has enough cognitive impairment that she cannot give me a history of how her evenings ago.  At this point I think we need to check some blood work.  This patient is 89 years of age.  I talked with her son about being comfortable with fixing things that are fixable but also excepting that some of this could just be aging.  He is very willing to do that.    Hypothyroidism  This is a chronic health problem for this patient where she is on  Synthroid 75 mcg daily.  She just ran out of that medication.  I recommended that we have her continue on that same dose because her blood work in the hospital in February was excellent.  We will just renew the medication.    Mild cognitive impairment  This patient is starting to exhibit signs of mild cognitive impairment.  With some prompting she knew who the president and  were but she has trouble with relating what went on this morning before she came into the office.  Her son has also noted that her cognition is not as good as it had been prior to her falling and breaking her hip in February.  We will keep an eye on this and determine whether or not any medication is needed.      Patient Active Problem List    Diagnosis Date Noted   • Fatigue 06/05/2019   • Mild cognitive impairment 06/05/2019   • Advanced care planning/counseling discussion 02/04/2019   • Anemia due to blood loss, acute 02/03/2019   • Abnormal CXR 02/02/2019   • Closed displaced fracture of right femoral neck ([facility]) 01/31/2019   • Vertigo 06/10/2018   • CKD (chronic kidney disease) stage 3, GFR 30-59 ml/min ([facility]) 01/11/2017   • Hyperlipidemia 05/14/2013   • Essential hypertension 05/14/2013   • Other malaise and fatigue 05/14/2013   • Hypothyroidism 05/14/2013   • Anemia 05/14/2013   • Arthropathy 05/14/2013   • Prediabetes 05/14/2013      Allergies:Patient has no known allergies.    Current Outpatient Prescriptions   Medication Sig Dispense Refill   • SYNTHROID 75 MCG Tab Take 1 Tab by mouth Every morning on an empty stomach. 90 Tab 3   • potassium chloride SA (KDUR) 20 MEQ Tab CR Take 1 Tab by mouth 2 times a day. 60 Tab 0   • ferrous sulfate 325 (65 Fe) MG tablet Take 1 Tab by mouth every day. 30 Tab      No current facility-administered medications for this visit.        Social History   Substance Use Topics   • Smoking status: Never Smoker   • Smokeless tobacco: Never Used   • Alcohol use 0.0 oz/week      Comment: occasional  "    Social History     Social History Narrative   • No narrative on file       Family History   Problem Relation Age of Onset   • Heart Disease Mother         MI   • Other Father         old age   • Alcohol/Drug Sister         alcoholism        ROS: This patient has cognitive impairment and cannot participate in review of systems.        Exam:    /78 (BP Location: Left arm, Patient Position: Sitting, BP Cuff Size: Adult)   Pulse 82   Temp 36.7 °C (98 °F) (Temporal)   Resp 16   Ht 1.6 m (5' 3\")   Wt 70 kg (154 lb 5.2 oz)   SpO2 94%  Body mass index is 27.34 kg/m².    General:  Well nourished, well developed female in NAD, she is well-groomed and has no signs of physical trauma.  She is oriented to person but not place and time.  Head is grossly normal.  Neck: Supple without JVD or bruit. Thyroid is not enlarged.  Pulmonary: Clear to ausculation and percussion.  Normal effort. No rales, ronchi, or wheezing.  Cardiovascular: Regular rate and rhythm without murmur. Carotid and radial pulses are intact and equal bilaterally.  Extremities: no clubbing, cyanosis, or edema.  Patient was seen for 25 minutes face to face of which, 20 minutes was spent counseling regarding medications interactions and side effects and current situation and plan going forward.  I tried to be realistic with the patient's son that his mother is now at 89 years of age and possibly the changes he is seeing are the future for her..    Please note that this dictation was created using voice recognition software. I have made every reasonable attempt to correct obvious errors, but I expect that there are errors of grammar and possibly content that I did not discover before finalizing the note.    Assessment/Plan:  1. Fatigue, unspecified type  Uncontrolled and unclear etiology.  We will check baseline blood work  - CBC WITH DIFFERENTIAL; Future  - Basic Metabolic Panel; Future    2. Essential hypertension  This problem is no longer " problematic.  She is not on medication and her blood pressure is excellent    3. Acquired hypothyroidism  Well-controlled with current dose of Synthroid.  Renewed for the coming year    4. Mild cognitive impairment  Uncontrolled, we will work through this as the months go on.

## 2019-06-05 NOTE — ASSESSMENT & PLAN NOTE
This is a chronic health problem for this patient where her son is noted over the last 4-6 weeks his mother has been sleeping an excessive amount of time.  There is some days that she will sleep till 3 PM in the afternoon.  He works from home and is aware that she does not get up during that time.  He does think she is getting up in the middle of the night and possibly eating high caffeine foods or drinking Coca-Cola which may be keeping her awake in the middle of the night.  This patient has enough cognitive impairment that she cannot give me a history of how her evenings ago.  At this point I think we need to check some blood work.  This patient is 89 years of age.  I talked with her son about being comfortable with fixing things that are fixable but also excepting that some of this could just be aging.  He is very willing to do that.

## 2019-06-20 ENCOUNTER — APPOINTMENT (OUTPATIENT)
Dept: MEDICAL GROUP | Facility: MEDICAL CENTER | Age: 84
End: 2019-06-20
Payer: COMMERCIAL

## 2019-06-20 LAB
BASOPHILS # BLD AUTO: 0.1 X10E3/UL (ref 0–0.2)
BASOPHILS NFR BLD AUTO: 1 %
BUN SERPL-MCNC: 17 MG/DL (ref 10–36)
BUN/CREAT SERPL: 15 (ref 12–28)
CALCIUM SERPL-MCNC: 9.5 MG/DL (ref 8.7–10.3)
CHLORIDE SERPL-SCNC: 108 MMOL/L (ref 96–106)
CO2 SERPL-SCNC: 18 MMOL/L (ref 20–29)
CREAT SERPL-MCNC: 1.14 MG/DL (ref 0.57–1)
EOSINOPHIL # BLD AUTO: 0.2 X10E3/UL (ref 0–0.4)
EOSINOPHIL NFR BLD AUTO: 3 %
ERYTHROCYTE [DISTWIDTH] IN BLOOD BY AUTOMATED COUNT: 14.4 % (ref 12.3–15.4)
GLUCOSE SERPL-MCNC: 93 MG/DL (ref 65–99)
HCT VFR BLD AUTO: 34 % (ref 34–46.6)
HGB BLD-MCNC: 11.4 G/DL (ref 11.1–15.9)
IMM GRANULOCYTES # BLD AUTO: 0 X10E3/UL (ref 0–0.1)
IMM GRANULOCYTES NFR BLD AUTO: 0 %
IMMATURE CELLS  115398: NORMAL
LYMPHOCYTES # BLD AUTO: 2.3 X10E3/UL (ref 0.7–3.1)
LYMPHOCYTES NFR BLD AUTO: 30 %
MCH RBC QN AUTO: 29.5 PG (ref 26.6–33)
MCHC RBC AUTO-ENTMCNC: 33.5 G/DL (ref 31.5–35.7)
MCV RBC AUTO: 88 FL (ref 79–97)
MONOCYTES # BLD AUTO: 0.7 X10E3/UL (ref 0.1–0.9)
MONOCYTES NFR BLD AUTO: 10 %
MORPHOLOGY BLD-IMP: NORMAL
NEUTROPHILS # BLD AUTO: 4.3 X10E3/UL (ref 1.4–7)
NEUTROPHILS NFR BLD AUTO: 56 %
NRBC BLD AUTO-RTO: NORMAL %
PLATELET # BLD AUTO: 239 X10E3/UL (ref 150–450)
POTASSIUM SERPL-SCNC: 4.1 MMOL/L (ref 3.5–5.2)
RBC # BLD AUTO: 3.86 X10E6/UL (ref 3.77–5.28)
SODIUM SERPL-SCNC: 140 MMOL/L (ref 134–144)
WBC # BLD AUTO: 7.6 X10E3/UL (ref 3.4–10.8)

## 2019-06-20 RX ORDER — LEVOTHYROXINE SODIUM 100 MCG
TABLET ORAL
Qty: 90 TAB | Refills: 1 | OUTPATIENT
Start: 2019-06-20

## 2019-06-20 RX ORDER — IRBESARTAN 150 MG/1
TABLET ORAL
Qty: 90 TAB | Refills: 3 | Status: SHIPPED | OUTPATIENT
Start: 2019-06-20 | End: 2019-06-27

## 2019-06-20 NOTE — TELEPHONE ENCOUNTER
Was the patient seen in the last year in this department? Yes LOV: 06/05/2019     Does patient have an active prescription for medications requested? Yes, No     Received Request Via: Pharmacy

## 2019-06-27 ENCOUNTER — OFFICE VISIT (OUTPATIENT)
Dept: MEDICAL GROUP | Facility: MEDICAL CENTER | Age: 84
End: 2019-06-27
Payer: COMMERCIAL

## 2019-06-27 VITALS
HEART RATE: 59 BPM | DIASTOLIC BLOOD PRESSURE: 62 MMHG | RESPIRATION RATE: 14 BRPM | WEIGHT: 155.87 LBS | TEMPERATURE: 98.2 F | BODY MASS INDEX: 27.62 KG/M2 | SYSTOLIC BLOOD PRESSURE: 124 MMHG | OXYGEN SATURATION: 93 % | HEIGHT: 63 IN

## 2019-06-27 DIAGNOSIS — N18.30 CKD (CHRONIC KIDNEY DISEASE) STAGE 3, GFR 30-59 ML/MIN (HCC): ICD-10-CM

## 2019-06-27 DIAGNOSIS — R53.83 FATIGUE, UNSPECIFIED TYPE: ICD-10-CM

## 2019-06-27 DIAGNOSIS — G31.84 MILD COGNITIVE IMPAIRMENT: ICD-10-CM

## 2019-06-27 DIAGNOSIS — R73.03 PREDIABETES: ICD-10-CM

## 2019-06-27 DIAGNOSIS — I10 ESSENTIAL HYPERTENSION: ICD-10-CM

## 2019-06-27 DIAGNOSIS — Z23 IMMUNIZATION DUE: ICD-10-CM

## 2019-06-27 DIAGNOSIS — D50.0 IRON DEFICIENCY ANEMIA DUE TO CHRONIC BLOOD LOSS: ICD-10-CM

## 2019-06-27 PROBLEM — D62 ANEMIA DUE TO BLOOD LOSS, ACUTE: Status: RESOLVED | Noted: 2019-02-03 | Resolved: 2019-06-27

## 2019-06-27 PROCEDURE — 90472 IMMUNIZATION ADMIN EACH ADD: CPT | Performed by: FAMILY MEDICINE

## 2019-06-27 PROCEDURE — 90670 PCV13 VACCINE IM: CPT | Performed by: FAMILY MEDICINE

## 2019-06-27 PROCEDURE — 99214 OFFICE O/P EST MOD 30 MIN: CPT | Mod: 25 | Performed by: FAMILY MEDICINE

## 2019-06-27 PROCEDURE — 90715 TDAP VACCINE 7 YRS/> IM: CPT | Performed by: FAMILY MEDICINE

## 2019-06-27 PROCEDURE — G0009 ADMIN PNEUMOCOCCAL VACCINE: HCPCS | Performed by: FAMILY MEDICINE

## 2019-06-27 NOTE — ASSESSMENT & PLAN NOTE
This patient had anemia in the past but we went ahead and she is been off of her ferrous sulfate so we checked her CBC.  Her white cell counts in the normal range at 7.6, hemoglobin 11.4 and platelet count is 239,000 all in the normal range.  There was no concerns on her CBC.  We will resolve anemia and to keep her off of the ferrous sulfate.

## 2019-06-27 NOTE — ASSESSMENT & PLAN NOTE
This is been a chronic health problem for this patient in the past but her blood sugars are doing very well.  On her blood work fasting she had a blood sugar of 93.  We will just continue to monitor.

## 2019-06-27 NOTE — PROGRESS NOTES
CC:Diagnoses of Immunization due, Iron deficiency anemia due to chronic blood loss, CKD (chronic kidney disease) stage 3, GFR 30-59 ml/min (HCC), Essential hypertension, Fatigue, unspecified type, Mild cognitive impairment, and Prediabetes were pertinent to this visit.    HISTORY OF PRESENT ILLNESS: Patient is a 89 y.o. female established patient who presents today to talk about her chronic health problems.  She is accompanied by her son with whom she resides.  He is her power of .  He is also the gentleman they keeps me informed as to how she is doing.  This lady when we last saw her on 6/5/2019 was not engaged and not conversant.  He brought up the fact that he thought she might be on too much medication.  She really was appearing fatigued and may be even a little depressed.  We did some blood work on her and that has come back indicating that she is doing well off of the medications that we stopped.  We will plan to recheck things in about 3 months.    Health Maintenance: Completed    Anemia  This patient had anemia in the past but we went ahead and she is been off of her ferrous sulfate so we checked her CBC.  Her white cell counts in the normal range at 7.6, hemoglobin 11.4 and platelet count is 239,000 all in the normal range.  There was no concerns on her CBC.  We will resolve anemia and to keep her off of the ferrous sulfate.    CKD (chronic kidney disease) stage 3, GFR 30-59 ml/min (HCC)  This is a chronic health problem for this patient that continues to show slight progression.  She has a GFR of 41, BUN 17 creatinine 1.14.  We will continue to monitor this.  She is on no nephrotoxic drugs.  She will continue with current lifestyle.    Essential hypertension  Had been a problem for this patient in the past but currently her blood pressure without medication is 124/62.  She has been doing better since being off the medication which may have made her blood pressure too low.  Patient is now back  playing cards and being a bit more interactive with the family.  We will have her continue off of meds.    Fatigue  This had been a problem for the patient and we done some blood work.  We have identified that her chronic kidney disease is slightly worse.  Other than that she is doing very well.  We will continue to monitor.    Mild cognitive impairment  This is a chronic health problem that is well controlled with current medications and lifestyle measures.    Prediabetes  This is been a chronic health problem for this patient in the past but her blood sugars are doing very well.  On her blood work fasting she had a blood sugar of 93.  We will just continue to monitor.      Patient Active Problem List    Diagnosis Date Noted   • Fatigue 06/05/2019   • Mild cognitive impairment 06/05/2019   • Advanced care planning/counseling discussion 02/04/2019   • Abnormal CXR 02/02/2019   • Closed displaced fracture of right femoral neck ([facility]) 01/31/2019   • Vertigo 06/10/2018   • CKD (chronic kidney disease) stage 3, GFR 30-59 ml/min ([facility]) 01/11/2017   • Hyperlipidemia 05/14/2013   • Other malaise and fatigue 05/14/2013   • Hypothyroidism 05/14/2013   • Arthropathy 05/14/2013   • Prediabetes 05/14/2013      Allergies:Patient has no known allergies.    Current Outpatient Prescriptions   Medication Sig Dispense Refill   • SYNTHROID 75 MCG Tab Take 1 Tab by mouth Every morning on an empty stomach. 90 Tab 3     No current facility-administered medications for this visit.        Social History   Substance Use Topics   • Smoking status: Never Smoker   • Smokeless tobacco: Never Used   • Alcohol use 0.0 oz/week      Comment: occasional     Social History     Social History Narrative   • No narrative on file       Family History   Problem Relation Age of Onset   • Heart Disease Mother         MI   • Other Father         old age   • Alcohol/Drug Sister         alcoholism        ROS: Patient unable to participate in review of systems  "secondary to dementia         Exam:    /62 (BP Location: Left arm, Patient Position: Sitting, BP Cuff Size: Adult)   Pulse (!) 59   Temp 36.8 °C (98.2 °F) (Temporal)   Resp 14   Ht 1.6 m (5' 3\")   Wt 70.7 kg (155 lb 13.8 oz)   SpO2 93%  Body mass index is 27.61 kg/m².    General:  Well nourished, well developed female in NAD oriented to person not place and time  Head is grossly normal.    LABS: 6/19/2019: Results reviewed and discussed with the patient, questions answered.    Please note that this dictation was created using voice recognition software. I have made every reasonable attempt to correct obvious errors, but I expect that there are errors of grammar and possibly content that I did not discover before finalizing the note.    Assessment/Plan:  1. Immunization due  Given today  - Pneumococcal Conjugate Vaccine 13-Valent IM  - TDAP VACCINE =>8YO IM    2. Iron deficiency anemia due to chronic blood loss  Now resolved.  I recommended that we just go ahead and stop ferrous sulfate.  We will do another CBC in 3 months and see if there is any problems or need for that.    3. CKD (chronic kidney disease) stage 3, GFR 30-59 ml/min ([facility])  This is a chronic illness that is progressing.  This may have been what is causing her to have some fatigue.  Definitely getting her blood pressure medicine stopped helped her to have more blood pressure and to feel better.    4. Essential hypertension  Adequately controlled just through lifestyle management.  Patient no longer needs to be on blood pressure meds.  We will recheck in 3 months  - CBC WITH DIFFERENTIAL; Future  - Comp Metabolic Panel; Future    5. Fatigue, unspecified type  Improved overall.  Patient son notes that she is much more engaged.  We will have her continue on only Synthroid as her chronic medication.    6. Mild cognitive impairment  Have seen improvement with taking with a antihypertensive    7. Prediabetes  Adequately controlled with current " meds and lifestyle

## 2019-06-27 NOTE — ASSESSMENT & PLAN NOTE
This is a chronic health problem for this patient that continues to show slight progression.  She has a GFR of 41, BUN 17 creatinine 1.14.  We will continue to monitor this.  She is on no nephrotoxic drugs.  She will continue with current lifestyle.

## 2019-06-27 NOTE — ASSESSMENT & PLAN NOTE
This had been a problem for the patient and we done some blood work.  We have identified that her chronic kidney disease is slightly worse.  Other than that she is doing very well.  We will continue to monitor.

## 2019-07-02 ENCOUNTER — APPOINTMENT (OUTPATIENT)
Dept: MEDICAL GROUP | Facility: MEDICAL CENTER | Age: 84
End: 2019-07-02
Payer: COMMERCIAL

## 2019-09-13 RX ORDER — LEVOTHYROXINE SODIUM 75 MCG
75 TABLET ORAL
Qty: 90 TAB | Refills: 3 | Status: SHIPPED | OUTPATIENT
Start: 2019-09-13 | End: 2020-09-25 | Stop reason: SDUPTHER

## 2019-09-13 NOTE — TELEPHONE ENCOUNTER
Was the patient seen in the last year in this department? Yes LOV:06/27/2019    Does patient have an active prescription for medications requested? No     Received Request Via: Patient     SYNTHROID 75 MCG Tab     Please advise provider out of clinic.

## 2019-09-16 ENCOUNTER — TELEPHONE (OUTPATIENT)
Dept: MEDICAL GROUP | Facility: MEDICAL CENTER | Age: 84
End: 2019-09-16

## 2019-09-16 NOTE — TELEPHONE ENCOUNTER
VOICEMAIL  1. Caller Name: Ginger Laura                        Call Back Number: [phone] (home)      2. Message: Patient called and left a message for a medication refill. Medication has been refilled.     3. Patient approves office to leave a detailed voicemail/MyChart message: N\A

## 2019-09-24 LAB
ALBUMIN SERPL-MCNC: 4.4 G/DL (ref 3.2–4.6)
ALBUMIN/GLOB SERPL: 1.8 {RATIO} (ref 1.2–2.2)
ALP SERPL-CCNC: 81 IU/L (ref 39–117)
ALT SERPL-CCNC: 11 IU/L (ref 0–32)
AST SERPL-CCNC: 17 IU/L (ref 0–40)
BASOPHILS # BLD AUTO: 0.1 X10E3/UL (ref 0–0.2)
BASOPHILS NFR BLD AUTO: 1 %
BILIRUB SERPL-MCNC: 0.4 MG/DL (ref 0–1.2)
BUN SERPL-MCNC: 25 MG/DL (ref 10–36)
BUN/CREAT SERPL: 23 (ref 12–28)
CALCIUM SERPL-MCNC: 9.4 MG/DL (ref 8.7–10.3)
CHLORIDE SERPL-SCNC: 105 MMOL/L (ref 96–106)
CO2 SERPL-SCNC: 19 MMOL/L (ref 20–29)
CREAT SERPL-MCNC: 1.07 MG/DL (ref 0.57–1)
EOSINOPHIL # BLD AUTO: 0.3 X10E3/UL (ref 0–0.4)
EOSINOPHIL NFR BLD AUTO: 4 %
ERYTHROCYTE [DISTWIDTH] IN BLOOD BY AUTOMATED COUNT: 13.9 % (ref 12.3–15.4)
GLOBULIN SER CALC-MCNC: 2.5 G/DL (ref 1.5–4.5)
GLUCOSE SERPL-MCNC: 91 MG/DL (ref 65–99)
HCT VFR BLD AUTO: 35.4 % (ref 34–46.6)
HGB BLD-MCNC: 12.1 G/DL (ref 11.1–15.9)
IMM GRANULOCYTES # BLD AUTO: 0 X10E3/UL (ref 0–0.1)
IMM GRANULOCYTES NFR BLD AUTO: 1 %
IMMATURE CELLS  115398: NORMAL
LYMPHOCYTES # BLD AUTO: 2.8 X10E3/UL (ref 0.7–3.1)
LYMPHOCYTES NFR BLD AUTO: 35 %
MCH RBC QN AUTO: 31.4 PG (ref 26.6–33)
MCHC RBC AUTO-ENTMCNC: 34.2 G/DL (ref 31.5–35.7)
MCV RBC AUTO: 92 FL (ref 79–97)
MONOCYTES # BLD AUTO: 0.9 X10E3/UL (ref 0.1–0.9)
MONOCYTES NFR BLD AUTO: 11 %
MORPHOLOGY BLD-IMP: NORMAL
NEUTROPHILS # BLD AUTO: 3.9 X10E3/UL (ref 1.4–7)
NEUTROPHILS NFR BLD AUTO: 48 %
NRBC BLD AUTO-RTO: NORMAL %
PLATELET # BLD AUTO: 238 X10E3/UL (ref 150–450)
POTASSIUM SERPL-SCNC: 4.3 MMOL/L (ref 3.5–5.2)
PROT SERPL-MCNC: 6.9 G/DL (ref 6–8.5)
RBC # BLD AUTO: 3.85 X10E6/UL (ref 3.77–5.28)
SODIUM SERPL-SCNC: 141 MMOL/L (ref 134–144)
WBC # BLD AUTO: 7.9 X10E3/UL (ref 3.4–10.8)

## 2019-09-27 ENCOUNTER — OFFICE VISIT (OUTPATIENT)
Dept: MEDICAL GROUP | Facility: MEDICAL CENTER | Age: 84
End: 2019-09-27
Payer: COMMERCIAL

## 2019-09-27 VITALS
SYSTOLIC BLOOD PRESSURE: 126 MMHG | BODY MASS INDEX: 29.45 KG/M2 | RESPIRATION RATE: 16 BRPM | OXYGEN SATURATION: 94 % | DIASTOLIC BLOOD PRESSURE: 66 MMHG | HEIGHT: 63 IN | TEMPERATURE: 97 F | WEIGHT: 166.23 LBS | HEART RATE: 68 BPM

## 2019-09-27 DIAGNOSIS — R73.03 PREDIABETES: ICD-10-CM

## 2019-09-27 DIAGNOSIS — Z23 NEED FOR VACCINATION: ICD-10-CM

## 2019-09-27 DIAGNOSIS — N18.30 CKD (CHRONIC KIDNEY DISEASE) STAGE 3, GFR 30-59 ML/MIN (HCC): ICD-10-CM

## 2019-09-27 DIAGNOSIS — G31.84 MILD COGNITIVE IMPAIRMENT: ICD-10-CM

## 2019-09-27 PROCEDURE — G0008 ADMIN INFLUENZA VIRUS VAC: HCPCS | Performed by: FAMILY MEDICINE

## 2019-09-27 PROCEDURE — 99214 OFFICE O/P EST MOD 30 MIN: CPT | Mod: 25 | Performed by: FAMILY MEDICINE

## 2019-09-27 PROCEDURE — 90662 IIV NO PRSV INCREASED AG IM: CPT | Performed by: FAMILY MEDICINE

## 2019-09-27 NOTE — ASSESSMENT & PLAN NOTE
This is a chronic health problem that is well controlled with current medications and lifestyle measures.  Her blood sugar is excellent at 91.  Although her other numbers are doing very well.

## 2019-09-27 NOTE — ASSESSMENT & PLAN NOTE
This is a chronic health problem that is uncontrolled with current medications and lifestyle measures. This is stable with GFR of 51.

## 2019-09-27 NOTE — PROGRESS NOTES
CC:Diagnoses of Need for vaccination, Prediabetes, CKD (chronic kidney disease) stage 3, GFR 30-59 ml/min (HCC), and Mild cognitive impairment were pertinent to this visit.    HISTORY OF PRESENT ILLNESS: Patient is a 89 y.o. female established patient who presents today to talk about her chronic health problems and blood work that was done.  She is accompanied today by her son with whom she resides.  He manages her diet and activities of daily living.  She has put on 11 pounds.  We had a discussion about the food choices that she has been requesting which are fairly high in calorie.  He is going to work on trying to cut back on those choices to lower calorie substitutes.  Her prediabetes is doing well and her chronic kidney disease is actually doing well but overall she is starting to show some progression of her cognitive impairment.      Prediabetes  This is a chronic health problem that is well controlled with current medications and lifestyle measures.  Her blood sugar is excellent at 91.  Although her other numbers are doing very well.    CKD (chronic kidney disease) stage 3, GFR 30-59 ml/min ([facility])  This is a chronic health problem that is uncontrolled with current medications and lifestyle measures. This is stable with GFR of 51.    Mild cognitive impairment  Still doing fairly well and dressing herself and living with her son.      Patient Active Problem List    Diagnosis Date Noted   • Fatigue 06/05/2019   • Mild cognitive impairment 06/05/2019   • Advanced care planning/counseling discussion 02/04/2019   • Abnormal CXR 02/02/2019   • Closed displaced fracture of right femoral neck ([facility]) 01/31/2019   • Vertigo 06/10/2018   • CKD (chronic kidney disease) stage 3, GFR 30-59 ml/min ([facility]) 01/11/2017   • Hyperlipidemia 05/14/2013   • Other malaise and fatigue 05/14/2013   • Hypothyroidism 05/14/2013   • Arthropathy 05/14/2013   • Prediabetes 05/14/2013      Allergies:Patient has no known allergies.    Current  "Outpatient Medications   Medication Sig Dispense Refill   • SYNTHROID 75 MCG Tab Take 1 Tab by mouth Every morning on an empty stomach. 90 Tab 3     No current facility-administered medications for this visit.        Social History     Tobacco Use   • Smoking status: Never Smoker   • Smokeless tobacco: Never Used   Substance Use Topics   • Alcohol use: Yes     Alcohol/week: 0.0 oz     Comment: occasional   • Drug use: No     Social History     Social History Narrative   • Not on file       Family History   Problem Relation Age of Onset   • Heart Disease Mother         MI   • Other Father         old age   • Alcohol/Drug Sister         alcoholism        ROS: Patient has cognitive impairment and cannot participate in review of systems.    Exam:    /66 (BP Location: Left arm, Patient Position: Sitting, BP Cuff Size: Adult)   Pulse 68   Temp 36.1 °C (97 °F) (Temporal)   Resp 16   Ht 1.6 m (5' 3\")   Wt 75.4 kg (166 lb 3.6 oz)   SpO2 94%  Body mass index is 29.45 kg/m².    General:  Well nourished, well developed female in NAD  Head is grossly normal.  Neck: Supple without JVD or bruit. Thyroid is not enlarged.  Pulmonary: Clear to ausculation and percussion.  Normal effort. No rales, ronchi, or wheezing.  Cardiovascular: Regular rate and rhythm without murmur. Carotid and radial pulses are intact and equal bilaterally.  Extremities: no clubbing, cyanosis, or edema.  LABS: 9/23/2019: Results reviewed and discussed with the patient, questions answered.    Please note that this dictation was created using voice recognition software. I have made every reasonable attempt to correct obvious errors, but I expect that there are errors of grammar and possibly content that I did not discover before finalizing the note.    Assessment/Plan:  1. Need for vaccination  Given today  - Influenza Vaccine, High Dose (65+ Only)    2. Prediabetes  Stable and well-controlled continue to monitor  - Comp Metabolic Panel; " Future    3. CKD (chronic kidney disease) stage 3, GFR 30-59 ml/min ([facility])  Stable and well-controlled, continue to monitor  - Comp Metabolic Panel; Future    4. Mild cognitive impairment  Patient showing some progression but doing very well in her son's home.

## 2020-02-11 ENCOUNTER — APPOINTMENT (OUTPATIENT)
Dept: MEDICAL GROUP | Facility: MEDICAL CENTER | Age: 85
End: 2020-02-11
Payer: COMMERCIAL

## 2020-02-25 LAB
ALBUMIN SERPL-MCNC: 4.6 G/DL (ref 3.5–4.6)
ALBUMIN/GLOB SERPL: 2.4 {RATIO} (ref 1.2–2.2)
ALP SERPL-CCNC: 72 IU/L (ref 39–117)
ALT SERPL-CCNC: 9 IU/L (ref 0–32)
AST SERPL-CCNC: 18 IU/L (ref 0–40)
BILIRUB SERPL-MCNC: 0.7 MG/DL (ref 0–1.2)
BUN SERPL-MCNC: 19 MG/DL (ref 10–36)
BUN/CREAT SERPL: 16 (ref 12–28)
CALCIUM SERPL-MCNC: 9.7 MG/DL (ref 8.7–10.3)
CHLORIDE SERPL-SCNC: 106 MMOL/L (ref 96–106)
CO2 SERPL-SCNC: 20 MMOL/L (ref 20–29)
CREAT SERPL-MCNC: 1.22 MG/DL (ref 0.57–1)
GLOBULIN SER CALC-MCNC: 1.9 G/DL (ref 1.5–4.5)
GLUCOSE SERPL-MCNC: 92 MG/DL (ref 65–99)
POTASSIUM SERPL-SCNC: 4.5 MMOL/L (ref 3.5–5.2)
PROT SERPL-MCNC: 6.5 G/DL (ref 6–8.5)
SODIUM SERPL-SCNC: 143 MMOL/L (ref 134–144)

## 2020-02-27 ENCOUNTER — OFFICE VISIT (OUTPATIENT)
Dept: MEDICAL GROUP | Facility: MEDICAL CENTER | Age: 85
End: 2020-02-27
Payer: COMMERCIAL

## 2020-02-27 VITALS
HEART RATE: 56 BPM | BODY MASS INDEX: 28.52 KG/M2 | HEIGHT: 63 IN | WEIGHT: 160.94 LBS | SYSTOLIC BLOOD PRESSURE: 116 MMHG | DIASTOLIC BLOOD PRESSURE: 60 MMHG | OXYGEN SATURATION: 95 % | RESPIRATION RATE: 17 BRPM | TEMPERATURE: 98.7 F

## 2020-02-27 DIAGNOSIS — E03.9 ACQUIRED HYPOTHYROIDISM: ICD-10-CM

## 2020-02-27 DIAGNOSIS — G31.84 MILD COGNITIVE IMPAIRMENT: ICD-10-CM

## 2020-02-27 DIAGNOSIS — N18.30 CKD (CHRONIC KIDNEY DISEASE) STAGE 3, GFR 30-59 ML/MIN: ICD-10-CM

## 2020-02-27 DIAGNOSIS — E78.5 HYPERLIPIDEMIA, UNSPECIFIED HYPERLIPIDEMIA TYPE: ICD-10-CM

## 2020-02-27 PROCEDURE — 99214 OFFICE O/P EST MOD 30 MIN: CPT | Performed by: FAMILY MEDICINE

## 2020-02-27 ASSESSMENT — PATIENT HEALTH QUESTIONNAIRE - PHQ9: CLINICAL INTERPRETATION OF PHQ2 SCORE: 0

## 2020-02-27 NOTE — PROGRESS NOTES
CC:Diagnoses of CKD (chronic kidney disease) stage 3, GFR 30-59 ml/min (Formerly [facility]), Hyperlipidemia, unspecified hyperlipidemia type, Acquired hypothyroidism, and Mild cognitive impairment were pertinent to this visit.    HISTORY OF PRESENT ILLNESS: Patient is a 89 y.o. female established patient who presents today accompanied by her son with whom she resides.  Patient did blood work prior to the visit to take a look at her kidney function.  She is staying quite stable.  We had a discussion about whether or not we should be more aggressive with any of her other chronic health problems such as hyperlipidemia.  The decision is made that we will continue to monitor how she is doing overall but not put her back on medications.  Patient is doing well interacting appropriately with her family they are seeing some progression in her cognitive impairment but it is has not caused problems within the family.      CKD (chronic kidney disease) stage 3, GFR 30-59 ml/min (Formerly [facility])  This is a chronic health problem that is well controlled with current medications and lifestyle measures.  Her GFR is stable at 38.  BUN was 19 creatinine 1.22.  We will continue to follow.    Hyperlipidemia  She will continue off of meds.    Hypothyroidism  This is a chronic health problem that is well controlled with current medications and lifestyle measures.    Mild cognitive impairment  Patient's family is seeing some progression in her cognitive impairment but overall she seems to be doing well and is not rapidly declining.This is a chronic health problem that is uncontrolled with current medications and lifestyle measures.      Patient Active Problem List    Diagnosis Date Noted   • Fatigue 06/05/2019   • Mild cognitive impairment 06/05/2019   • Advanced care planning/counseling discussion 02/04/2019   • Abnormal CXR 02/02/2019   • Closed displaced fracture of right femoral neck (HCC) 01/31/2019   • Vertigo 06/10/2018   • CKD (chronic kidney disease) stage  "3, GFR 30-59 ml/min (Formerly [facility]) 01/11/2017   • Hyperlipidemia 05/14/2013   • Other malaise and fatigue 05/14/2013   • Hypothyroidism 05/14/2013   • Arthropathy 05/14/2013   • Prediabetes 05/14/2013      Allergies:Patient has no known allergies.    Current Outpatient Medications   Medication Sig Dispense Refill   • SYNTHROID 75 MCG Tab Take 1 Tab by mouth Every morning on an empty stomach. 90 Tab 3     No current facility-administered medications for this visit.        Social History     Tobacco Use   • Smoking status: Never Smoker   • Smokeless tobacco: Never Used   Substance Use Topics   • Alcohol use: Yes     Alcohol/week: 0.0 oz     Comment: occasional   • Drug use: No     Social History     Social History Narrative   • Not on file       Family History   Problem Relation Age of Onset   • Heart Disease Mother         MI   • Other Father         old age   • Alcohol/Drug Sister         alcoholism        ROS: Patient has cognitive impairment and cannot fully participate in review of systems.        Exam:    /60 (BP Location: Left arm, Patient Position: Sitting, BP Cuff Size: Adult)   Pulse (!) 56   Temp 37.1 °C (98.7 °F)   Resp 17   Ht 1.6 m (5' 3\")   Wt 73 kg (160 lb 15 oz)   SpO2 95%  Body mass index is 28.51 kg/m².    General:  Well nourished, well developed female in NAD  Head is grossly normal.  Neck: Supple without JVD or bruit. Thyroid is not enlarged.  Pulmonary: Clear to ausculation and percussion.  Normal effort. No rales, ronchi, or wheezing.  Cardiovascular: Regular rate and rhythm without murmur. Carotid and radial pulses are intact and equal bilaterally.  Extremities: no clubbing, cyanosis, or edema.  LABS: 2/24/2020: Results reviewed and discussed with the patient, questions answered.    Please note that this dictation was created using voice recognition software. I have made every reasonable attempt to correct obvious errors, but I expect that there are errors of grammar and possibly content " that I did not discover before finalizing the note.    Assessment/Plan:  1. CKD (chronic kidney disease) stage 3, GFR 30-59 ml/min (HCC)  Stable and not showing any further progression.  GFR 38.  We will continue to monitor every 6 months    2. Hyperlipidemia, unspecified hyperlipidemia type  Decision made that the patient will continue off of medication she is 89 years of age    3. Acquired hypothyroidism  Will recheck lab work prior to her next visit  - FREE THYROXINE; Future  - TSH; Future  - TRIIDOTHYRONINE; Future    4. Mild cognitive impairment  This continues to progress and we will continue to monitor.

## 2020-02-27 NOTE — ASSESSMENT & PLAN NOTE
Patient's family is seeing some progression in her cognitive impairment but overall she seems to be doing well and is not rapidly declining.This is a chronic health problem that is uncontrolled with current medications and lifestyle measures.

## 2020-02-27 NOTE — ASSESSMENT & PLAN NOTE
This is a chronic health problem that is well controlled with current medications and lifestyle measures.  Her GFR is stable at 38.  BUN was 19 creatinine 1.22.  We will continue to follow.

## 2020-06-08 ENCOUNTER — OFFICE VISIT (OUTPATIENT)
Dept: MEDICAL GROUP | Facility: PHYSICIAN GROUP | Age: 85
End: 2020-06-08
Payer: COMMERCIAL

## 2020-06-08 ENCOUNTER — HOSPITAL ENCOUNTER (OUTPATIENT)
Dept: LAB | Facility: MEDICAL CENTER | Age: 85
End: 2020-06-08
Attending: FAMILY MEDICINE
Payer: COMMERCIAL

## 2020-06-08 VITALS
WEIGHT: 160 LBS | RESPIRATION RATE: 16 BRPM | SYSTOLIC BLOOD PRESSURE: 126 MMHG | HEART RATE: 55 BPM | DIASTOLIC BLOOD PRESSURE: 72 MMHG | TEMPERATURE: 98.2 F | HEIGHT: 63 IN | OXYGEN SATURATION: 96 % | BODY MASS INDEX: 28.35 KG/M2

## 2020-06-08 DIAGNOSIS — E03.9 ACQUIRED HYPOTHYROIDISM: ICD-10-CM

## 2020-06-08 DIAGNOSIS — G31.84 MILD COGNITIVE IMPAIRMENT: ICD-10-CM

## 2020-06-08 LAB
T3 SERPL-MCNC: 74.1 NG/DL (ref 60–181)
T4 FREE SERPL-MCNC: 1.5 NG/DL (ref 0.93–1.7)
TSH SERPL DL<=0.005 MIU/L-ACNC: 1.73 UIU/ML (ref 0.38–5.33)

## 2020-06-08 PROCEDURE — 84443 ASSAY THYROID STIM HORMONE: CPT

## 2020-06-08 PROCEDURE — 36415 COLL VENOUS BLD VENIPUNCTURE: CPT

## 2020-06-08 PROCEDURE — 84480 ASSAY TRIIODOTHYRONINE (T3): CPT

## 2020-06-08 PROCEDURE — 99214 OFFICE O/P EST MOD 30 MIN: CPT | Performed by: FAMILY MEDICINE

## 2020-06-08 PROCEDURE — 84439 ASSAY OF FREE THYROXINE: CPT

## 2020-06-08 ASSESSMENT — FIBROSIS 4 INDEX: FIB4 SCORE: 2.39

## 2020-06-08 NOTE — PROGRESS NOTES
CC:Diagnoses of Acquired hypothyroidism and Mild cognitive impairment were pertinent to this visit.    HISTORY OF PRESENT ILLNESS: Patient is a 89 y.o. female established patient who presents today to follow-up on labs that unfortunately did not get done.  With the Corona crisis her son has been afraid to take her to the lab.  We had a discussion about how to deal with this and for them to go to the lab this afternoon on their way back home.  He has time to do that and will take her.      Hypothyroidism  This is a chronic health problem for this patient for which she is on Synthroid 75 mcg daily.  Her son was to have done lab work prior to the visit but unfortunately they did not have a chance to get to the lab.  I recommended that he go ahead and get the lab work done and then I will notify him via text message or [employer] if her dose is appropriate.  He seen no change in her energy level.  He has no concerns that her dose is abnormal.  We will wait to see what the labs show us.    Mild cognitive impairment  This is a chronic health problem for this patient that is continuing to progress with age.  Her son tells me that he has been mostly keeping her home because of the corona crisis.  She has stopped fixating on going to play slot machines since the casinos were closed due to corona.  I recommended to him that he does not bring it up to her again.  With her memory loss she is not going to remember to bring it up.  It is safer for her to be home where she can be isolated.      Patient Active Problem List    Diagnosis Date Noted   • Fatigue 06/05/2019   • Mild cognitive impairment 06/05/2019   • Advanced care planning/counseling discussion 02/04/2019   • Abnormal CXR 02/02/2019   • Closed displaced fracture of right femoral neck ([facility]) 01/31/2019   • Vertigo 06/10/2018   • CKD (chronic kidney disease) stage 3, GFR 30-59 ml/min ([facility]) 01/11/2017   • Hyperlipidemia 05/14/2013   • Other malaise and fatigue 05/14/2013   •  "Hypothyroidism 05/14/2013   • Arthropathy 05/14/2013   • Prediabetes 05/14/2013      Allergies:Patient has no known allergies.    Current Outpatient Medications   Medication Sig Dispense Refill   • SYNTHROID 75 MCG Tab Take 1 Tab by mouth Every morning on an empty stomach. 90 Tab 3     No current facility-administered medications for this visit.        Social History     Tobacco Use   • Smoking status: Never Smoker   • Smokeless tobacco: Never Used   Substance Use Topics   • Alcohol use: Yes     Alcohol/week: 0.0 oz     Comment: occasional   • Drug use: No     Social History     Social History Narrative   • Not on file       Family History   Problem Relation Age of Onset   • Heart Disease Mother         MI   • Other Father         old age   • Alcohol/Drug Sister         alcoholism        ROS: Patient has memory loss and cannot participate in review of systems.       Exam:    /72 (BP Location: Left arm, Patient Position: Sitting, BP Cuff Size: Adult)   Pulse (!) 55   Temp 36.8 °C (98.2 °F) (Temporal)   Resp 16   Ht 1.6 m (5' 3\")   Wt 72.6 kg (160 lb)   SpO2 96%  Body mass index is 28.34 kg/m².    General:  Well nourished, well developed female in NAD  Head is grossly normal.  Neck: Supple without JVD or bruit. Thyroid is not enlarged.  Pulmonary: Clear to ausculation and percussion.  Normal effort. No rales, ronchi, or wheezing.  Cardiovascular: Regular rate and rhythm without murmur. Carotid and radial pulses are intact and equal bilaterally.  Extremities: no clubbing, cyanosis, or edema.  Patient was seen for 25 minutes face to face of which, 20 minutes was spent counseling regarding discussion of medications interactions and side effects.  Also a discussion about her memory loss and how we want to proceed with health care going forward.  We have taken her off of her medications except for the Synthroid.  Her son is comfortable with keeping her comfortable.  He will try to keep her home as much as he " can.  They are social distancing and isolating at home.    Please note that this dictation was created using voice recognition software. I have made every reasonable attempt to correct obvious errors, but I expect that there are errors of grammar and possibly content that I did not discover before finalizing the note.    Assessment/Plan:  1. Acquired hypothyroidism  Unknown control, patient is going to get thyroid testing and then we will contact her son via text message to let him know that the thyroid dose is appropriate and then we will continue on Synthroid 75 for the coming year.    2. Mild cognitive impairment  Continuing to show progression with her mild cognitive impairment which is turning into full-blown dementia.  They will continue to keep her comfortable at home and at some point let nature take its course.

## 2020-06-08 NOTE — ASSESSMENT & PLAN NOTE
This is a chronic health problem for this patient that is continuing to progress with age.  Her son tells me that he has been mostly keeping her home because of the corona crisis.  She has stopped fixating on going to play slot machines since the casinos were closed due to corona.  I recommended to him that he does not bring it up to her again.  With her memory loss she is not going to remember to bring it up.  It is safer for her to be home where she can be isolated.

## 2020-09-25 RX ORDER — LEVOTHYROXINE SODIUM 75 MCG
75 TABLET ORAL
Qty: 90 TAB | Refills: 3 | Status: SHIPPED | OUTPATIENT
Start: 2020-09-25 | End: 2021-11-22 | Stop reason: SDUPTHER

## 2021-01-11 DIAGNOSIS — Z23 NEED FOR VACCINATION: ICD-10-CM

## 2021-07-17 ENCOUNTER — APPOINTMENT (OUTPATIENT)
Dept: RADIOLOGY | Facility: MEDICAL CENTER | Age: 86
End: 2021-07-17
Attending: EMERGENCY MEDICINE
Payer: COMMERCIAL

## 2021-07-17 ENCOUNTER — HOSPITAL ENCOUNTER (EMERGENCY)
Facility: MEDICAL CENTER | Age: 86
End: 2021-07-17
Attending: EMERGENCY MEDICINE
Payer: COMMERCIAL

## 2021-07-17 VITALS
DIASTOLIC BLOOD PRESSURE: 63 MMHG | HEART RATE: 66 BPM | BODY MASS INDEX: 28.35 KG/M2 | WEIGHT: 160 LBS | OXYGEN SATURATION: 96 % | SYSTOLIC BLOOD PRESSURE: 137 MMHG | HEIGHT: 63 IN | TEMPERATURE: 98 F | RESPIRATION RATE: 16 BRPM

## 2021-07-17 DIAGNOSIS — R40.4 TRANSIENT ALTERATION OF AWARENESS: ICD-10-CM

## 2021-07-17 LAB
ALBUMIN SERPL BCP-MCNC: 4 G/DL (ref 3.2–4.9)
ALBUMIN/GLOB SERPL: 1.5 G/DL
ALP SERPL-CCNC: 78 U/L (ref 30–99)
ALT SERPL-CCNC: 7 U/L (ref 2–50)
AMPHET UR QL SCN: NEGATIVE
ANION GAP SERPL CALC-SCNC: 15 MMOL/L (ref 7–16)
APPEARANCE UR: CLEAR
AST SERPL-CCNC: 19 U/L (ref 12–45)
BACTERIA #/AREA URNS HPF: NEGATIVE /HPF
BARBITURATES UR QL SCN: NEGATIVE
BASOPHILS # BLD AUTO: 1 % (ref 0–1.8)
BASOPHILS # BLD: 0.07 K/UL (ref 0–0.12)
BENZODIAZ UR QL SCN: NEGATIVE
BILIRUB SERPL-MCNC: 0.7 MG/DL (ref 0.1–1.5)
BILIRUB UR QL STRIP.AUTO: NEGATIVE
BUN SERPL-MCNC: 16 MG/DL (ref 8–22)
BZE UR QL SCN: NEGATIVE
CALCIUM SERPL-MCNC: 9 MG/DL (ref 8.5–10.5)
CANNABINOIDS UR QL SCN: NEGATIVE
CHLORIDE SERPL-SCNC: 107 MMOL/L (ref 96–112)
CO2 SERPL-SCNC: 17 MMOL/L (ref 20–33)
COLOR UR: YELLOW
CREAT SERPL-MCNC: 1.17 MG/DL (ref 0.5–1.4)
EOSINOPHIL # BLD AUTO: 0.12 K/UL (ref 0–0.51)
EOSINOPHIL NFR BLD: 1.6 % (ref 0–6.9)
EPI CELLS #/AREA URNS HPF: NEGATIVE /HPF
ERYTHROCYTE [DISTWIDTH] IN BLOOD BY AUTOMATED COUNT: 42 FL (ref 35.9–50)
ETHANOL BLD-MCNC: <10.1 MG/DL (ref 0–10)
GLOBULIN SER CALC-MCNC: 2.6 G/DL (ref 1.9–3.5)
GLUCOSE SERPL-MCNC: 125 MG/DL (ref 65–99)
GLUCOSE UR STRIP.AUTO-MCNC: NEGATIVE MG/DL
HCT VFR BLD AUTO: 34.5 % (ref 37–47)
HGB BLD-MCNC: 12.1 G/DL (ref 12–16)
HYALINE CASTS #/AREA URNS LPF: NORMAL /LPF
IMM GRANULOCYTES # BLD AUTO: 0.05 K/UL (ref 0–0.11)
IMM GRANULOCYTES NFR BLD AUTO: 0.7 % (ref 0–0.9)
KETONES UR STRIP.AUTO-MCNC: NEGATIVE MG/DL
LEUKOCYTE ESTERASE UR QL STRIP.AUTO: ABNORMAL
LYMPHOCYTES # BLD AUTO: 1.89 K/UL (ref 1–4.8)
LYMPHOCYTES NFR BLD: 25.9 % (ref 22–41)
MCH RBC QN AUTO: 32.7 PG (ref 27–33)
MCHC RBC AUTO-ENTMCNC: 35.1 G/DL (ref 33.6–35)
MCV RBC AUTO: 93.2 FL (ref 81.4–97.8)
METHADONE UR QL SCN: NEGATIVE
MICRO URNS: ABNORMAL
MONOCYTES # BLD AUTO: 0.53 K/UL (ref 0–0.85)
MONOCYTES NFR BLD AUTO: 7.3 % (ref 0–13.4)
NEUTROPHILS # BLD AUTO: 4.64 K/UL (ref 2–7.15)
NEUTROPHILS NFR BLD: 63.5 % (ref 44–72)
NITRITE UR QL STRIP.AUTO: NEGATIVE
NRBC # BLD AUTO: 0 K/UL
NRBC BLD-RTO: 0 /100 WBC
OPIATES UR QL SCN: NEGATIVE
OXYCODONE UR QL SCN: NEGATIVE
PCP UR QL SCN: NEGATIVE
PH UR STRIP.AUTO: 6 [PH] (ref 5–8)
PLATELET # BLD AUTO: 197 K/UL (ref 164–446)
PMV BLD AUTO: 9 FL (ref 9–12.9)
POTASSIUM SERPL-SCNC: 3.9 MMOL/L (ref 3.6–5.5)
PROPOXYPH UR QL SCN: NEGATIVE
PROT SERPL-MCNC: 6.6 G/DL (ref 6–8.2)
PROT UR QL STRIP: NEGATIVE MG/DL
RBC # BLD AUTO: 3.7 M/UL (ref 4.2–5.4)
RBC # URNS HPF: NORMAL /HPF
RBC UR QL AUTO: NEGATIVE
SODIUM SERPL-SCNC: 139 MMOL/L (ref 135–145)
SP GR UR STRIP.AUTO: 1.01
TROPONIN T SERPL-MCNC: 18 NG/L (ref 6–19)
UROBILINOGEN UR STRIP.AUTO-MCNC: 0.2 MG/DL
WBC # BLD AUTO: 7.3 K/UL (ref 4.8–10.8)
WBC #/AREA URNS HPF: NORMAL /HPF

## 2021-07-17 PROCEDURE — 71045 X-RAY EXAM CHEST 1 VIEW: CPT

## 2021-07-17 PROCEDURE — 82077 ASSAY SPEC XCP UR&BREATH IA: CPT

## 2021-07-17 PROCEDURE — 85025 COMPLETE CBC W/AUTO DIFF WBC: CPT

## 2021-07-17 PROCEDURE — 99285 EMERGENCY DEPT VISIT HI MDM: CPT

## 2021-07-17 PROCEDURE — 80307 DRUG TEST PRSMV CHEM ANLYZR: CPT

## 2021-07-17 PROCEDURE — 81001 URINALYSIS AUTO W/SCOPE: CPT

## 2021-07-17 PROCEDURE — 80053 COMPREHEN METABOLIC PANEL: CPT

## 2021-07-17 PROCEDURE — 84484 ASSAY OF TROPONIN QUANT: CPT

## 2021-07-17 ASSESSMENT — FIBROSIS 4 INDEX: FIB4 SCORE: 2.42

## 2021-07-18 NOTE — ED NOTES
Pt's son at bedside. Clarified that pt was found in the shower chair dry heaving and having what appeared to be a presyncopal episode. Pt is NOT normally GCS 15, normally GCS 14 & is at her baseline. Personal care provided & purewick applied + warm blankets. Dr. Martinez speaking with family.   The types of intraocular lenses were reviewed with the patient along with a discussion of their various strengths and weaknesses.

## 2021-07-18 NOTE — ED PROVIDER NOTES
ED Provider Note    Scribed for Peyton Martinez M.D. by Brayden Do. 7/17/2021, 5:36 PM.    Primary care provider: Tia Marquez M.D.  Means of arrival: EMS  History obtained from: Patient  History limited by: altered mental status    CHIEF COMPLAINT  Chief Complaint   Patient presents with   • ALOC     Last normal at 1610. Pt was in shower (lives with son) in hsower chair, son came in to check on pt at 1610 & pt was incoherent (normally GCS 15), repetitive speech. EMS called, on scene pt was A&Ox1 & not able to follow commands, one word answers. Now, pt GCS 14, no unilateral deficits, no numbness, clear speech. Baseline: hard of hearing & incontinent.       HPI  Ginger Laura is a 89 y.o. female who presents to the Emergency Department for evaluation of altered mental status. As per nursing staff, the patient was in the shower where she seemed to be altered. She was last seen normal at 4:10 PM with repetitive speech. She was not able to follow commands. She was found incontinent of her bowels. Patient is hard of hearing. She denies pain. No alleviating factors were reported. Further history limited secondary to altered mental status.     Patient's son is at bedside and says she is at her baseline at this time.  The story was more that the patient was in the shower for about 15 or 20 minutes the son went to check on her and found her minimally responsive.    REVIEW OF SYSTEMS  ROS limited secondary to altered mental status. Pertinent positives include altered mental status and bowel incontinence. Pertinent negatives include no pain.  All other systems reviewed and negative.    PAST MEDICAL HISTORY   has a past medical history of Anemia, Arthritis, CKD (chronic kidney disease) stage 3, GFR 30-59 ml/min ([facility]) (1/11/2017), HEMATURIA, Hyperglycemia (5/14/2013), Hyperlipidemia, Hypertension, Hypothyroid, Mild cognitive impairment (6/5/2019), Non-intractable cyclical vomiting with nausea (6/4/2018), and  "Prediabetes (5/14/2013).    SURGICAL HISTORY   has a past surgical history that includes hysterectomy, vaginal; tonsillectomy; and hip hemiarthroplasty (Right, 2/1/2019).    SOCIAL HISTORY  Social History     Tobacco Use   • Smoking status: Never Smoker   • Smokeless tobacco: Never Used   Substance Use Topics   • Alcohol use: Yes     Alcohol/week: 0.0 oz     Comment: occasional   • Drug use: No      Social History     Substance and Sexual Activity   Drug Use No       FAMILY HISTORY  Family History   Problem Relation Age of Onset   • Heart Disease Mother         MI   • Other Father         old age   • Alcohol/Drug Sister         alcoholism       CURRENT MEDICATIONS  Home Medications    **Home medications have not yet been reviewed for this encounter**         ALLERGIES  No Known Allergies    PHYSICAL EXAM  VITAL SIGNS: /63   Pulse (!) 54   Temp 36.7 °C (98 °F)   Resp 18   Ht 1.6 m (5' 3\")   Wt 72.6 kg (160 lb)   LMP  (LMP Unknown)   SpO2 95%   BMI 28.34 kg/m²     Constitutional: Alert in no apparent distress. Extremely hard of hearing.  HENT: No signs of trauma, Bilateral external ears normal, Nose normal.   Eyes: Pupils are equal and reactive, Conjunctiva normal, Non-icteric.   Neck: Normal range of motion, No tenderness, Supple, No stridor.   Cardiovascular: Regular rate and rhythm, no murmurs.   Thorax & Lungs: Normal breath sounds, No respiratory distress, No wheezing, No chest tenderness.   Abdomen: Bowel sounds normal, Soft, No tenderness, No masses, No peritoneal signs.  Skin: Warm, Dry, No erythema, No rash.   Musculoskeletal:  No major deformities noted.   Neurologic: Alert, moving all extremities without difficulty, no focal deficits.    LABS  Labs Reviewed   CBC WITH DIFFERENTIAL - Abnormal; Notable for the following components:       Result Value    RBC 3.70 (*)     Hematocrit 34.5 (*)     MCHC 35.1 (*)     All other components within normal limits   COMP METABOLIC PANEL - Abnormal; Notable " for the following components:    Co2 17 (*)     Glucose 125 (*)     All other components within normal limits   ESTIMATED GFR - Abnormal; Notable for the following components:    GFR If  52 (*)     GFR If Non  43 (*)     All other components within normal limits   DIAGNOSTIC ALCOHOL   TROPONIN   URINE DRUG SCREEN   URINALYSIS,CULTURE IF INDICATED   POCT GLUCOSE   POCT URINALYSIS     All labs reviewed by me.      RADIOLOGY  DX-CHEST-PORTABLE (1 VIEW)   Final Result      Mild cardiomegaly.        The radiologist's interpretation of all radiological studies have been reviewed by me.    COURSE & MEDICAL DECISION MAKING  Pertinent Labs & Imaging studies reviewed. (See chart for details)    Differential diagnoses include but are not limited to: electrolyte abnormality, orthostasis    5:36 PM - Patient seen and examined at bedside. Patient had difficulty hearing and answering my questions.  Ordered DX-Chest, POCT glucose, CBC with diff, CMP, diagnostic alcohol, POCT UA, and urine drug screen to evaluate her symptoms.     6:22 PM Patient was reevaluated at bedside. Patient's son was at bedside and stated that he found her completely passed out in the shower. He believes she is back to baseline at this time. He states her appetite has decreased in the past few days.      Decision Making:  This is a 89 y.o. year old female who presents with transient altered mental status.  I suspect the patient was essentially orthostatic in the shower she had not eaten anything she was sitting in a chair with warm water coming over her.  She is now back to her baseline.  Labs at this point are unremarkable her CBC and CMP is normal.  Troponin is normal x-ray is unremarkable.  As long as her urinalysis is also unremarkable I do think she can be discharged home.  Patient is agreeable to this plan.     The patient will return for new or worsening symptoms and is stable at the time of discharge. Patient was given  return precautions. Patient and/or family member verbalizes understanding and will comply.    DISPOSITION:  Patient will be discharged home in stable condition.    FOLLOW UP:  [facility], Emergency Dept  [address]  [place] [address] [phone]    Return for worsening symptoms pain or other concerns.      OUTPATIENT MEDICATIONS:  New Prescriptions    No medications on file         FINAL IMPRESSION  1. Transient alteration of awareness               This dictation has been created using voice recognition software and/or scribes. The accuracy of the dictation is limited by the abilities of the software and the expertise of the scribes. I expect there may be some errors of grammar and possibly content. I made every attempt to manually correct the errors within my dictation. However, errors related to voice recognition software and/or scribes may still exist and should be interpreted within the appropriate context.     IBrayden (Scribe), am scribing for, and in the presence of, Peyton Martinez M.D..    Electronically signed by: Brayden Do ([place]), 7/17/2021    Peyton MACIAS M.D. personally performed the services described in this documentation, as scribed by Brayden Do in my presence, and it is both accurate and complete.    The note accurately reflects work and decisions made by me.  Peyton Martinez M.D.  7/17/2021  7:53 PM

## 2021-07-18 NOTE — ED NOTES
DC instructions reviewed with pt and son. Pt and son verbalized understanding. Pt to ER lobby via WC. Son to provide transportation home.

## 2021-07-18 NOTE — ED TRIAGE NOTES
Ginger Laura  89 y.o.  female  Chief Complaint   Patient presents with   • ALOC     Last normal at 1610. Pt was in shower (lives with son) in hsower chair, son came in to check on pt at 1610 & pt was incoherent (normally GCS 15), repetitive speech. EMS called, on scene pt was A&Ox1 & not able to follow commands, one word answers. Now, pt GCS 14, no unilateral deficits, no numbness, clear speech. Baseline: hard of hearing & incontinent.     Hx TIA with unilateral deficits. No recent falls or head injury. Pt currently follows commands but does not know where she is or the year. POC  with EMS.

## 2021-11-16 NOTE — CARE PLAN
Ms. Modesto Cancino is a 52 y.o. y/o female with history of CVA who presents today for anticoagulation monitoring and adjustment. INR 2.4 is therapeutic for this patient (goal range 2-3) and is reflective of 40 mg TWD  Patient verifies current dosing regimen, patient able to verbally recall dose  Patient reports 0  missed doses since last INR   Patient denies s/sx clotting and/or stroke  Patient denies hematuria, epistaxis, rectal bleeding  Patient denies changes in diet, alcohol, or tobacco use  Reviewed medication list and drug allergies with patient, updated any medication additions or modifications accordingly.    Patient also denies any pending medical or dental procedures scheduled at this time  Patient was instructed to continue 40 mg TWD and RTC 2 weeks      For Pharmacy Admin Tracking Only     Intervention Detail:    Total # of Interventions Recommended: 0   Total # of Interventions Accepted: 0   Time Spent (min): 15 Problem: Pain Management  Goal: Pain level will decrease to patient's comfort goal  Outcome: PROGRESSING AS EXPECTED  Pain managed with oral medication    Problem: Mobility  Goal: Risk for activity intolerance will decrease  Outcome: PROGRESSING AS EXPECTED  Pt ambulates well with SBA to x1A and FWW    Problem: Psychosocial Needs:  Goal: Level of anxiety will decrease  Outcome: PROGRESSING AS EXPECTED  Pt frequently anxious but able to be redirected or comforted easily.

## 2021-11-22 DIAGNOSIS — E03.9 ACQUIRED HYPOTHYROIDISM: ICD-10-CM

## 2021-11-22 RX ORDER — LEVOTHYROXINE SODIUM 75 MCG
75 TABLET ORAL
Qty: 90 TABLET | Refills: 3 | Status: SHIPPED | OUTPATIENT
Start: 2021-11-22 | End: 2022-11-21

## 2022-05-18 ENCOUNTER — TELEPHONE (OUTPATIENT)
Dept: SCHEDULING | Facility: IMAGING CENTER | Age: 87
End: 2022-05-18
Payer: COMMERCIAL

## 2022-06-01 ENCOUNTER — OFFICE VISIT (OUTPATIENT)
Dept: INTERNAL MEDICINE | Facility: OTHER | Age: 87
End: 2022-06-01
Payer: COMMERCIAL

## 2022-06-01 VITALS
DIASTOLIC BLOOD PRESSURE: 67 MMHG | SYSTOLIC BLOOD PRESSURE: 117 MMHG | BODY MASS INDEX: 27.71 KG/M2 | OXYGEN SATURATION: 96 % | HEART RATE: 64 BPM | WEIGHT: 156.4 LBS

## 2022-06-01 DIAGNOSIS — E03.9 ACQUIRED HYPOTHYROIDISM: ICD-10-CM

## 2022-06-01 DIAGNOSIS — R55 SYNCOPE AND COLLAPSE: ICD-10-CM

## 2022-06-01 DIAGNOSIS — Z71.89 ADVANCED CARE PLANNING/COUNSELING DISCUSSION: ICD-10-CM

## 2022-06-01 PROCEDURE — 99214 OFFICE O/P EST MOD 30 MIN: CPT | Mod: GC

## 2022-06-01 PROCEDURE — 93000 ELECTROCARDIOGRAM COMPLETE: CPT

## 2022-06-01 ASSESSMENT — FIBROSIS 4 INDEX: FIB4 SCORE: 3.54

## 2022-06-01 NOTE — PROGRESS NOTES
Date of Service:  6/1/2022    CC: Establish Care    HPI:  Ginger Laura  is a 89 y.o. female with a PMH of hyperlipidemia, hypothyroidism, arthropathy, prediabetes, CKD stage 3, fatigue, and mild cognitive impairment. She is here to establish care with her son, who is her caregiver. She has not been to a physician in around a year.     Patient is currently non-verbal and non-communicative. Son tries to keep her diet consistent and gives her three square meals per day.   Son is mainly concerned about patient having black outs for 5-10 seconds and at times she will drop onto her face, if he is not there to catch her. She has had 10 of these, son has caught her seven times. Last week she fell on a cabinet, no head trauma noted.     Trying to keep diet consistently, tries to do give three square meals per day  -Black outs, 5/10 seconds, and will drop on face, 10 of these, caught seven times, fell last week, on cabinet.   Patient has had some issues drinking water. Son cannot force patient to drink. She has a history of TIA 30 years ago. No history of carotid artery stenosis. No history of diabetes. Patient has been tolerating small meals throughout the day.     Only medication that patient has been taking is synthroid. She has had swollen feet bilaterally. Her ankles have increased in size. No history of heart failure. She currently lives with her son.     Patient has not established diagnosis of dementia or Alzheimer's. Currently is non-verbal and non-communicative most of the time. She has had sleep dysregulation. Had many all nighters for two years. For the past two months she has had a consistent sleep schedule, sleeping from 12:30-2:00 pm. She patient is sleeping is often moves around and is not still. Patient does not ambulate well on her own. She enjoys going for drives and going out for coffee.     Her son did not feel prepared to be a caregiver, but has been doing well overall. He assists her with many of  her ADLs.     Review of systems:  Patient is unable to answer ROS, mostly non-verbal     Past Medical History:  Patient Active Problem List    Diagnosis Date Noted   • Syncope and collapse 06/04/2022   • Fatigue 06/05/2019   • Mild cognitive impairment 06/05/2019   • Advanced care planning/counseling discussion 02/04/2019   • Abnormal CXR 02/02/2019   • Closed displaced fracture of right femoral neck ([facility]) 01/31/2019   • Vertigo 06/10/2018   • CKD (chronic kidney disease) stage 3, GFR 30-59 ml/min ([facility]) 01/11/2017   • Hyperlipidemia 05/14/2013   • Other malaise and fatigue 05/14/2013   • Hypothyroidism 05/14/2013   • Arthropathy 05/14/2013   • Prediabetes 05/14/2013       Past Surgical History:    has a past surgical history that includes hysterectomy, vaginal; tonsillectomy; and hip hemiarthroplasty (Right, 2/1/2019).    Medications:  Current Outpatient Medications   Medication Sig Dispense Refill   • SYNTHROID 75 MCG Tab Take 1 Tablet by mouth every morning on an empty stomach. 90 Tablet 3     No current facility-administered medications for this visit.       Allergies:  No Known Allergies    Family History:   family history includes Alcohol/Drug in her sister; Heart Disease in her mother; Other in her father.     Social History:    Social History     Tobacco Use   • Smoking status: Never Smoker   • Smokeless tobacco: Never Used   Substance Use Topics   • Alcohol use: Yes     Alcohol/week: 0.0 oz     Comment: occasional   • Drug use: No     Employment: Retired  Activity Level: minimal  Living situation:  Lives with son  Recent travel: none  Other (stressors, spirituality, exposures):  none    Physical Exam:  Vitals:    06/01/22 1355   BP: 117/67   Pulse: 64   SpO2: 96%     Body mass index is 27.71 kg/m².  Physical Exam  Constitutional:       General: She is not in acute distress.     Appearance: She is normal weight.   HENT:      Head: Normocephalic and atraumatic.      Nose: Nose normal. No congestion or  rhinorrhea.      Mouth/Throat:      Mouth: Mucous membranes are moist.      Pharynx: No oropharyngeal exudate or posterior oropharyngeal erythema.   Eyes:      Extraocular Movements: Extraocular movements intact.      Conjunctiva/sclera: Conjunctivae normal.      Pupils: Pupils are equal, round, and reactive to light.   Cardiovascular:      Rate and Rhythm: Normal rate and regular rhythm.      Pulses: Normal pulses.      Heart sounds: Normal heart sounds. No murmur heard.    No friction rub. No gallop.   Pulmonary:      Effort: Pulmonary effort is normal. No respiratory distress.      Breath sounds: Normal breath sounds. No wheezing or rales.   Abdominal:      General: Abdomen is flat. Bowel sounds are normal. There is no distension.      Tenderness: There is no abdominal tenderness. There is no guarding.   Musculoskeletal:         General: No swelling. Normal range of motion.      Cervical back: Normal range of motion and neck supple.      Right lower leg: Edema (2+) present.      Left lower leg: Edema (2+) present.   Skin:     General: Skin is warm and dry.   Neurological:      Mental Status: She is alert. Mental status is at baseline.      Comments: Non-verbal, unsure to establish AO          Labs:  none    Imaging:  EKG: Sinus rhythms with PVCs    Assessment/Plan:  Syncope and collapse  Patient has had multiple syncopal episodes, where she loses consciousness and then may loose muscular function and drop to the ground. EKG did not show any alarming findings.   Because of patient's age, concern for possible aortic stenosis, or other cardiogenic process. Overall EKG looks normal for age. No acute heart damage. Some delay in heart beats.   Decided to not pursue further work up.     Plan:  -Jobst stockings to help maintain blood pressure  -Encouraged son to keep patient hydrated, will have her drink V8 juice, soups and salty foods to increased likelihood that she will not have low blood pressure episodes, which may  contribute to the syncope.   -Lukewarm showers, to prevent vaso-vagal response    Advanced care planning/counseling discussion  Referral to [facility] for [facility]. Would like son to understand options and to receive help if needed.     Hypothyroidism  Patient is unable to express if any changes in symptomology. Has been on long term levothyroxine 75 mg.     Plan:   -TSH and Free T4  -Continue 75 mg of synthroid currently             All imaging results and lab results and consult notes are reviewed at this visit.  Follow up: Return in about 2 months (around 8/1/2022).    Katja Fuller MD  Internal Medicine PGY-1

## 2022-06-01 NOTE — PATIENT INSTRUCTIONS
EKG looks good for her age, no acute heart damage. Some delayed heart beats.     2.  Referral to [facility] for [facility]    3. Wear Jobst stockings to help maintain blood pressure  -Try to keep her hydrated, try to increase salt intake (such as V8 juice, soups, chips, salted peanuts)  -Lukewarm showers

## 2022-06-04 PROBLEM — R55 SYNCOPE AND COLLAPSE: Status: ACTIVE | Noted: 2022-06-04

## 2022-06-05 NOTE — ASSESSMENT & PLAN NOTE
Patient has had multiple syncopal episodes, where she loses consciousness and then may loose muscular function and drop to the ground. EKG did not show any alarming findings.   Because of patient's age, concern for possible aortic stenosis, or other cardiogenic process. Overall EKG looks normal for age. No acute heart damage. Some delay in heart beats.   Decided to not pursue further work up.     Plan:  -Jobst stockings to help maintain blood pressure  -Encouraged son to keep patient hydrated, will have her drink V8 juice, soups and salty foods to increased likelihood that she will not have low blood pressure episodes, which may contribute to the syncope.   -Lukewarm showers, to prevent vaso-vagal response

## 2022-06-05 NOTE — ASSESSMENT & PLAN NOTE
Referral to [facility] for aging. Would like son to understand options and to receive help if needed.

## 2022-06-05 NOTE — ASSESSMENT & PLAN NOTE
Patient is unable to express if any changes in symptomology. Has been on long term levothyroxine 75 mg.     Plan:   -TSH and Free T4  -Continue 75 mg of synthroid currently

## 2022-11-20 DIAGNOSIS — E03.9 ACQUIRED HYPOTHYROIDISM: ICD-10-CM

## 2022-11-21 RX ORDER — LEVOTHYROXINE SODIUM 75 MCG
TABLET ORAL
Qty: 90 TABLET | Refills: 3 | Status: SHIPPED | OUTPATIENT
Start: 2022-11-21 | End: 2023-11-15 | Stop reason: SDUPTHER

## 2022-11-21 NOTE — TELEPHONE ENCOUNTER
Received request via: Pharmacy    Was the patient seen in the last year in this department? Yes    Does the patient have an active prescription (recently filled or refills available) for medication(s) requested? No    Does the patient have shelter Plus and need 100 day supply (blood pressure, diabetes and cholesterol meds only)? Patient does not have SCP

## 2023-11-15 DIAGNOSIS — E03.9 ACQUIRED HYPOTHYROIDISM: ICD-10-CM

## 2023-11-15 RX ORDER — LEVOTHYROXINE SODIUM 75 MCG
TABLET ORAL
Qty: 90 TABLET | Refills: 3 | OUTPATIENT
Start: 2023-11-15

## 2023-11-16 RX ORDER — LEVOTHYROXINE SODIUM 75 MCG
75 TABLET ORAL
Qty: 90 TABLET | Refills: 3 | Status: SHIPPED | OUTPATIENT
Start: 2023-11-16 | End: 2023-12-11 | Stop reason: SDUPTHER

## 2023-12-11 ENCOUNTER — OFFICE VISIT (OUTPATIENT)
Dept: INTERNAL MEDICINE | Facility: OTHER | Age: 88
End: 2023-12-11
Payer: COMMERCIAL

## 2023-12-11 VITALS
SYSTOLIC BLOOD PRESSURE: 143 MMHG | DIASTOLIC BLOOD PRESSURE: 75 MMHG | BODY MASS INDEX: 29.44 KG/M2 | OXYGEN SATURATION: 98 % | HEIGHT: 62 IN | WEIGHT: 160 LBS | TEMPERATURE: 96.8 F | HEART RATE: 66 BPM

## 2023-12-11 DIAGNOSIS — T16.2XXA EAR FOREIGN BODY, LEFT, INITIAL ENCOUNTER: ICD-10-CM

## 2023-12-11 DIAGNOSIS — N18.30 STAGE 3 CHRONIC KIDNEY DISEASE, UNSPECIFIED WHETHER STAGE 3A OR 3B CKD: ICD-10-CM

## 2023-12-11 DIAGNOSIS — R53.83 FATIGUE, UNSPECIFIED TYPE: ICD-10-CM

## 2023-12-11 DIAGNOSIS — R73.03 PREDIABETES: ICD-10-CM

## 2023-12-11 DIAGNOSIS — T16.2XXA FOREIGN BODY OF LEFT EAR, INITIAL ENCOUNTER: ICD-10-CM

## 2023-12-11 DIAGNOSIS — F01.C0 SEVERE VASCULAR DEMENTIA WITHOUT BEHAVIORAL DISTURBANCE, PSYCHOTIC DISTURBANCE, MOOD DISTURBANCE, OR ANXIETY (HCC): ICD-10-CM

## 2023-12-11 DIAGNOSIS — F02.C0 SEVERE ALZHEIMER'S DEMENTIA WITHOUT BEHAVIORAL DISTURBANCE, PSYCHOTIC DISTURBANCE, MOOD DISTURBANCE, OR ANXIETY, UNSPECIFIED TIMING OF DEMENTIA ONSET (HCC): ICD-10-CM

## 2023-12-11 DIAGNOSIS — E03.9 ACQUIRED HYPOTHYROIDISM: ICD-10-CM

## 2023-12-11 DIAGNOSIS — G30.9 SEVERE ALZHEIMER'S DEMENTIA WITHOUT BEHAVIORAL DISTURBANCE, PSYCHOTIC DISTURBANCE, MOOD DISTURBANCE, OR ANXIETY, UNSPECIFIED TIMING OF DEMENTIA ONSET (HCC): ICD-10-CM

## 2023-12-11 PROBLEM — R26.2 DIFFICULTY IN WALKING, NOT ELSEWHERE CLASSIFIED: Status: ACTIVE | Noted: 2019-02-05

## 2023-12-11 PROBLEM — R41.841 COGNITIVE COMMUNICATION DEFICIT: Status: ACTIVE | Noted: 2019-02-08

## 2023-12-11 PROBLEM — R60.9 EDEMA, UNSPECIFIED: Status: ACTIVE | Noted: 2019-02-05

## 2023-12-11 PROCEDURE — 99214 OFFICE O/P EST MOD 30 MIN: CPT | Mod: GC

## 2023-12-11 PROCEDURE — 3078F DIAST BP <80 MM HG: CPT

## 2023-12-11 PROCEDURE — 3077F SYST BP >= 140 MM HG: CPT

## 2023-12-11 RX ORDER — LEVOTHYROXINE SODIUM 75 MCG
75 TABLET ORAL
Qty: 90 TABLET | Refills: 3 | Status: SHIPPED | OUTPATIENT
Start: 2023-12-11

## 2023-12-11 NOTE — PATIENT INSTRUCTIONS
Follow up labs   Referral for geriatrics  Referral to audiology for removal of hearing aid cap  4. Follow up in one month

## 2023-12-11 NOTE — PROGRESS NOTES
Date of Service:  12/11/2023    CC: Follow up    HPI:  Ginger Laura  is a 89 y.o. female with a medical history of CKD stage 3, cognitive communication deficiency, Dementia, and lower extremity edema. Patient is non-verbal and is unable to communicate. Patient's son is her full time caregiver. She presents with her son who provides history. Patient is still strong. She is currently spoon fed a few times per day. She is still able to stand and walk on her own. Son feels that syncopal like episodes were due to patient's initial decreased food intake. Patient will eat when spoon fed, but will close her mouth when she is no longer hungry. She has no issues with swallowing at this time.     Patient has not gotten sick in the past five years, and has not had any cold like symptoms. Patient is still taking synthroid. She has not had any other physical symptoms, but has overall been slowing down. Patient is incontinent of urine, but is able to get up and go to the restroom at times for bowel movements. Last visit patient's son spoke with  from the [facility], will place referral for patient so he can understand what services are available in the community.       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, psychotic disturbance, mood disturbance, or anxiety ([facility]) 12/13/2023    Syncope and collapse 06/04/2022    Fatigue 06/05/2019    Mild cognitive impairment 06/05/2019    Cognitive communication deficit 02/08/2019    Difficulty in walking, not elsewhere classified 02/05/2019    Edema, unspecified 02/05/2019    Advanced care planning/counseling discussion 02/04/2019    Abnormal CXR 02/02/2019    Closed displaced fracture of right femoral neck ([facility]) 01/31/2019    CKD (chronic kidney disease) stage 3, GFR 30-59 ml/min ([facility]) 01/11/2017     Hyperlipidemia 05/14/2013    Hypothyroidism 05/14/2013    Arthropathy 05/14/2013    Prediabetes 05/14/2013       Past Surgical History:    has a past surgical history that includes hysterectomy, vaginal; tonsillectomy; and hip hemiarthroplasty (Right, 2/1/2019).    Medications:  Current Outpatient Medications   Medication Sig Dispense Refill    SYNTHROID 75 MCG Tab Take 1 Tablet by mouth every morning on an empty stomach. 90 Tablet 3     No current facility-administered medications for this visit.       Allergies:  No Known Allergies    Family History:   family history includes Alcohol/Drug in her sister; Heart Disease in her mother; Other in her father.     Social History:    Social History     Tobacco Use    Smoking status: Never    Smokeless tobacco: Never   Substance Use Topics    Alcohol use: Yes     Alcohol/week: 0.0 oz     Comment: occasional    Drug use: No     Employment: Retired, lives with son  Activity Level: Wheelchair bound, moves on own seldomly  Recent travel:  none  Other (stressors, spirituality, exposures):  non-verbal    Physical Exam:  Vitals:    12/11/23 1339   BP: (!) 143/75   Pulse: 66   Temp: 36 °C (96.8 °F)   SpO2: 98%     Body mass index is 29.26 kg/m².  Physical Exam  Constitutional:       Appearance: Normal appearance.      Comments: In wheelchair   HENT:      Head: Normocephalic and atraumatic.      Right Ear: Tympanic membrane and ear canal normal.      Left Ear: A foreign body is present.      Ears:      Comments: Left ear, hearing aid plastic cap in left ear  Eyes:      Extraocular Movements: Extraocular movements intact.   Cardiovascular:      Rate and Rhythm: Normal rate and regular rhythm.      Pulses: Normal pulses.      Heart sounds: Normal heart sounds. No murmur heard.     No gallop.   Pulmonary:      Effort: Pulmonary effort is normal. No respiratory distress.      Breath sounds: Normal breath sounds. No stridor. No wheezing or rales.   Abdominal:      General: Abdomen is  flat. Bowel sounds are normal. There is no distension.      Palpations: Abdomen is soft. There is no mass.      Tenderness: There is no abdominal tenderness. There is no guarding.   Musculoskeletal:         General: Normal range of motion.      Right lower leg: Edema (1+) present.      Left lower leg: Edema (Trace) present.      Comments: Unable to follow commands, decreased muscle tone   Skin:     General: Skin is warm and dry.      Coloration: Skin is not jaundiced or pale.      Findings: No bruising.      Comments: Left big toe, yellow coloration   Neurological:      Mental Status: She is alert. Mental status is at baseline.      Motor: Weakness present.      Comments: Unable to answer questions, unable to communicate with yes/no or signs.           Labs:  none    Imaging:  none    Assessment/Plan:  Hypothyroidism  Patient has been on levothyroxine for years. Labs have not been completed in a few years. No signs or symptoms noted of hypo or hyperthyroid. No changes in weight, and lightheadedness and dizziness have resolved.     Plan:  -Continue synthroid 75 mg  -TSH with T4 reflex    Foreign body in left ear  Hearing aid cap in left year, no signs of erythema or other irritation. Patient's son will take patient to audiologist to have removed.     Plan:  -Referral to audiologist  -follow up next visti    CKD (chronic kidney disease) stage 3, GFR 30-59 ml/min ([facility])  Patient has history of CKD, also has edema in both legs bilaterally. Have not had labs in some time. Unsure what current baseline is.     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 Is non-verbal. Patient's son has been taking care of her ADLs. Unsure if fatigue or vitamin deficiency plays a role. No recent imaging of head.     Plan:  -Referral to Geriatrics  -Folate, B 12  -CBC, CMP  -TSH with T 4 reflux            All  imaging results and lab results and consult notes are reviewed at this visit.  Followup: Return in about 1 month (around 1/11/2024).    Katja Fuller MD  Internal Medicine PGY-3

## 2023-12-12 PROBLEM — R42 VERTIGO: Status: RESOLVED | Noted: 2018-06-10 | Resolved: 2023-12-12

## 2023-12-13 PROBLEM — T16.2XXA FOREIGN BODY IN LEFT EAR: Status: ACTIVE | Noted: 2023-12-13

## 2023-12-13 PROBLEM — F01.C0 SEVERE VASCULAR DEMENTIA WITHOUT BEHAVIORAL DISTURBANCE, PSYCHOTIC DISTURBANCE, MOOD DISTURBANCE, OR ANXIETY (HCC): Status: ACTIVE | Noted: 2023-12-13

## 2023-12-13 NOTE — ASSESSMENT & PLAN NOTE
Patient has been on levothyroxine for years. Labs have not been completed in a few years. No signs or symptoms noted of hypo or hyperthyroid. No changes in weight, and lightheadedness and dizziness have resolved.     Plan:  -Continue synthroid 75 mg  -TSH with T4 reflex   Valtrex Counseling: I discussed with the patient the risks of valacyclovir including but not limited to kidney damage, nausea, vomiting and severe allergy.  The patient understands that if the infection seems to be worsening or is not improving, they are to call.

## 2023-12-13 NOTE — ASSESSMENT & PLAN NOTE
Drug   Name Date  9/22/22 Date  9/26/22 Date   Date   Date   Date   Date   Date     Amp 532 Pending         Meth 6122 Pending         Alprazolam 29 Pending         Alpha-  hydroxyalprazolam 99 Pending         ETG 9636 Pending         ETS 3612 Pending          Pending           Forwarded to [facility] RNs, therapist, and MD for review      Saskia Whittington RN, ADN     Patient has had worsening cognitive decline. Is non-verbal. Patient's son has been taking care of her ADLs. Unsure if fatigue or vitamin deficiency plays a role. No recent imaging of head.     Plan:  -Referral to Geriatrics  -Folate, B 12  -CBC, CMP  -TSH with T 4 reflux

## 2023-12-13 NOTE — ASSESSMENT & PLAN NOTE
Hearing aid cap in left year, no signs of erythema or other irritation. Patient's son will take patient to audiologist to have removed.     Plan:  -Referral to audiologist  -follow up next visti

## 2023-12-13 NOTE — ASSESSMENT & PLAN NOTE
Patient has history of CKD, also has edema in both legs bilaterally. Have not had labs in some time. Unsure what current baseline is.     Plan:  -CMP

## 2024-01-15 ENCOUNTER — APPOINTMENT (OUTPATIENT)
Dept: INTERNAL MEDICINE | Facility: OTHER | Age: 89
End: 2024-01-15
Payer: COMMERCIAL

## 2025-02-05 ENCOUNTER — TELEPHONE (OUTPATIENT)
Dept: INTERNAL MEDICINE | Facility: OTHER | Age: OVER 89
End: 2025-02-05
Payer: COMMERCIAL

## 2025-02-05 DIAGNOSIS — E03.9 ACQUIRED HYPOTHYROIDISM: ICD-10-CM

## 2025-02-05 RX ORDER — LEVOTHYROXINE SODIUM 75 MCG
75 TABLET ORAL
Qty: 5 TABLET | Refills: 0 | Status: SHIPPED | OUTPATIENT
Start: 2025-02-05 | End: 2025-02-10 | Stop reason: SDUPTHER

## 2025-02-05 NOTE — TELEPHONE ENCOUNTER
Received request via: Pharmacy  I called patient lvm to call and have labs done and to make an appointment.  Was the patient seen in the last year in this department? No    Does the patient have an active prescription (recently filled or refills available) for medication(s) requested?  yes    Pharm[facility] Name: [facility]    Does the patient have [facility]way Plus and need 100-day supply? (This applies to ALL medications) Patient does not have SCP

## 2025-02-07 NOTE — TELEPHONE ENCOUNTER
Called and spoke with patients son, he picked up the 5 pills that were prescribed to his mother.  However, she has an appointment on February 24th to establish care at a [facility] due to her insurance, [employer], not being accepted through [facility] since the last time she was seen.  The son is asking if anyway she can get a bridge to make it to the February 24th appt?  Patients son would like a call back if medication could be prescribed.

## 2025-02-10 RX ORDER — LEVOTHYROXINE SODIUM 75 MCG
75 TABLET ORAL
Qty: 30 TABLET | Refills: 0 | Status: SHIPPED | OUTPATIENT
Start: 2025-02-10

## (undated) DEVICE — SODIUM CHL IRRIGATION 0.9% 1000ML (12EA/CA)

## (undated) DEVICE — SENSOR SPO2 NEO LNCS ADHESIVE (20/BX) SEE USER NOTES

## (undated) DEVICE — KIT ROOM DECONTAMINATION

## (undated) DEVICE — PROTECTOR ULNA NERVE - (36PR/CA)

## (undated) DEVICE — CHLORAPREP 26 ML APPLICATOR - ORANGE TINT(25/CA)

## (undated) DEVICE — GLOVE BIOGEL SZ 7.5 SURGICAL PF LTX - (50PR/BX 4BX/CA)

## (undated) DEVICE — CANISTER SUCTION RIGID RED 1500CC (40EA/CA)

## (undated) DEVICE — MIXER BONE CEMENT REVOLUTION - W/FEMORAL PRESSURIZER (6/CA)

## (undated) DEVICE — WATER IRRIGATION STERILE 1000ML (12EA/CA)

## (undated) DEVICE — SUTURE GENERAL

## (undated) DEVICE — HUMID-VENT HEAT AND MOISTURE EXCHANGE- (50/BX)

## (undated) DEVICE — GOWN WARMING STANDARD FLEX - (30/CA)

## (undated) DEVICE — KIT HIP PREP IM ENCHANCE TOTAL (5EA/BX)

## (undated) DEVICE — SUTURE 2-0 VICRYL PLUS CT-1 - 8 X 18 INCH(12/BX)

## (undated) DEVICE — BAG, SPONGE COUNT 50600

## (undated) DEVICE — ELECTRODE 850 FOAM ADHESIVE - HYDROGEL RADIOTRNSPRNT (50/PK)

## (undated) DEVICE — ELECTRODE DUAL RETURN W/ CORD - (50/PK)

## (undated) DEVICE — SOD. CHL. INJ. 0.9% 1000 ML - (14EA/CA 60CA/PF)

## (undated) DEVICE — BRUSH FMCNL TIP IRR STRL - (12/PKG) FOR SURGILAV

## (undated) DEVICE — SUTURE 5 TI-CRON HOS-14 - (36/BX)

## (undated) DEVICE — HEAD HOLDER JUNIOR/ADULT

## (undated) DEVICE — NEPTUNE 4 PORT MANIFOLD - (20/PK)

## (undated) DEVICE — MASK ANESTHESIA ADULT  - (100/CA)

## (undated) DEVICE — SUTURE 2-0 MONOCRYL PLUS UNDYED CT-1 1 X 36 (36EA/BX)"

## (undated) DEVICE — BLADE SAGITTAL SAW DUAL CUT 75.0 X 25.0MM (1/EA)

## (undated) DEVICE — TUBE CONNECTING SUCTION - CLEAR PLASTIC STERILE 72 IN (50EA/CA)

## (undated) DEVICE — KIT ANESTHESIA W/CIRCUIT & 3/LT BAG W/FILTER (20EA/CA)

## (undated) DEVICE — SUCTION INSTRUMENT YANKAUER BULBOUS TIP W/O VENT (50EA/CA)

## (undated) DEVICE — BLOCK

## (undated) DEVICE — SUTURE 1 VICRYL PLUS CTX - 8 X 18 INCH (12/BX)

## (undated) DEVICE — DRESSING AQUACEL AG ADVANTAGE 3.5 X 10" (10EA/BX)"

## (undated) DEVICE — GLOVE, LITE (PAIR)

## (undated) DEVICE — PACK TOTAL HIP - (1/CA)

## (undated) DEVICE — GLOVE BIOGEL INDICATOR SZ 8 SURGICAL PF LTX - (50/BX 4BX/CA)